# Patient Record
Sex: MALE | Race: WHITE | NOT HISPANIC OR LATINO | Employment: FULL TIME | URBAN - METROPOLITAN AREA
[De-identification: names, ages, dates, MRNs, and addresses within clinical notes are randomized per-mention and may not be internally consistent; named-entity substitution may affect disease eponyms.]

---

## 2018-03-14 ENCOUNTER — OFFICE VISIT (OUTPATIENT)
Dept: FAMILY MEDICINE | Facility: CLINIC | Age: 43
End: 2018-03-14
Payer: COMMERCIAL

## 2018-03-14 VITALS
HEIGHT: 74 IN | HEART RATE: 74 BPM | TEMPERATURE: 98 F | WEIGHT: 262 LBS | DIASTOLIC BLOOD PRESSURE: 82 MMHG | SYSTOLIC BLOOD PRESSURE: 128 MMHG | BODY MASS INDEX: 33.62 KG/M2

## 2018-03-14 DIAGNOSIS — R23.8 SKIN IRRITATION: ICD-10-CM

## 2018-03-14 DIAGNOSIS — T14.8XXA EXCORIATION: Primary | ICD-10-CM

## 2018-03-14 PROCEDURE — 3079F DIAST BP 80-89 MM HG: CPT | Mod: CPTII,S$GLB,, | Performed by: NURSE PRACTITIONER

## 2018-03-14 PROCEDURE — 3074F SYST BP LT 130 MM HG: CPT | Mod: CPTII,S$GLB,, | Performed by: NURSE PRACTITIONER

## 2018-03-14 PROCEDURE — 99999 PR PBB SHADOW E&M-EST. PATIENT-LVL III: CPT | Mod: PBBFAC,,, | Performed by: NURSE PRACTITIONER

## 2018-03-14 PROCEDURE — 99213 OFFICE O/P EST LOW 20 MIN: CPT | Mod: SA,S$GLB,, | Performed by: NURSE PRACTITIONER

## 2018-03-14 RX ORDER — MUPIROCIN 20 MG/G
OINTMENT TOPICAL 3 TIMES DAILY
Qty: 1 TUBE | Refills: 0 | Status: SHIPPED | OUTPATIENT
Start: 2018-03-14 | End: 2018-03-24

## 2018-03-14 NOTE — PROGRESS NOTES
Subjective:       Patient ID: Luke Del Rosario is a 42 y.o. male.    Chief Complaint: Rash (over the eye)    Rash   This is a new problem. The current episode started today. The problem is unchanged. The affected locations include theface. The rash is characterized by dryness. He was exposed to nothing. Pertinent negatives include no anorexia, congestion, cough, diarrhea, eye pain, facial edema, fatigue, fever, joint pain, nail changes, rhinorrhea, shortness of breath, sore throat or vomiting. Past treatments include nothing. The treatment provided no relief. His past medical history is significant for varicella. There is no history of allergies, asthma or eczema.     Past Medical History:   Diagnosis Date    HLD (hyperlipidemia)     HTN (hypertension) 2/7/2012    Hypertension     Migraines     Panic disorder     Panic disorder      Social History     Social History    Marital status:      Spouse name: N/A    Number of children: N/A    Years of education: N/A     Occupational History    Not on file.     Social History Main Topics    Smoking status: Never Smoker    Smokeless tobacco: Never Used    Alcohol use Yes      Comment: Socially    Drug use: No    Sexual activity: Yes     Partners: Female     Social History Narrative    No narrative on file     History reviewed. No pertinent surgical history.    Review of Systems   Constitutional: Negative.  Negative for fatigue and fever.   HENT: Negative.  Negative for congestion, rhinorrhea and sore throat.    Eyes: Negative.  Negative for pain.   Respiratory: Negative.  Negative for cough and shortness of breath.    Cardiovascular: Negative.    Gastrointestinal: Negative.  Negative for anorexia, diarrhea and vomiting.   Endocrine: Negative.    Genitourinary: Negative.    Musculoskeletal: Negative.  Negative for joint pain.   Skin: Positive for rash. Negative for nail changes.   Allergic/Immunologic: Negative.    Neurological: Negative.     Psychiatric/Behavioral: Negative.        Objective:      Physical Exam   Constitutional: He is oriented to person, place, and time. He appears well-developed and well-nourished.   HENT:   Head: Normocephalic.   Right Ear: External ear normal.   Left Ear: External ear normal.   Nose: Nose normal.   Mouth/Throat: Oropharynx is clear and moist.   Eyes: Conjunctivae are normal. Pupils are equal, round, and reactive to light.       Neck: Normal range of motion. Neck supple.   Cardiovascular: Normal rate, regular rhythm and normal heart sounds.    Pulmonary/Chest: Effort normal and breath sounds normal.   Abdominal: Soft. Bowel sounds are normal.   Musculoskeletal: Normal range of motion.   Neurological: He is oriented to person, place, and time.   Skin: Skin is warm and dry. Capillary refill takes 2 to 3 seconds.   Psychiatric: He has a normal mood and affect. His behavior is normal. Judgment and thought content normal.   Nursing note and vitals reviewed.      Assessment:       1. Excoriation    2. Skin irritation        Plan:           Luke was seen today for rash.    Diagnoses and all orders for this visit:    Excoriation  Skin irritation  -     mupirocin (BACTROBAN) 2 % ointment; Apply topically 3 (three) times daily.  Report to ER immediately if symptoms worsen  F/U with PCP

## 2019-05-09 ENCOUNTER — TELEPHONE (OUTPATIENT)
Dept: FAMILY MEDICINE | Facility: CLINIC | Age: 44
End: 2019-05-09

## 2019-05-09 NOTE — TELEPHONE ENCOUNTER
----- Message from Mary Lester sent at 5/9/2019  3:21 PM CDT -----   Patient would like to schedule appointment . New patient

## 2019-05-09 NOTE — TELEPHONE ENCOUNTER
Returned pt call. Unable to connect with patient. Left a vm message asking pt to call back to schedule the appt.

## 2019-05-10 ENCOUNTER — OFFICE VISIT (OUTPATIENT)
Dept: FAMILY MEDICINE | Facility: CLINIC | Age: 44
End: 2019-05-10
Payer: COMMERCIAL

## 2019-05-10 ENCOUNTER — LAB VISIT (OUTPATIENT)
Dept: LAB | Facility: HOSPITAL | Age: 44
End: 2019-05-10
Attending: NURSE PRACTITIONER
Payer: COMMERCIAL

## 2019-05-10 VITALS
SYSTOLIC BLOOD PRESSURE: 146 MMHG | TEMPERATURE: 99 F | DIASTOLIC BLOOD PRESSURE: 96 MMHG | WEIGHT: 262.38 LBS | BODY MASS INDEX: 33.67 KG/M2 | HEART RATE: 73 BPM | HEIGHT: 74 IN

## 2019-05-10 DIAGNOSIS — Z23 IMMUNIZATION DUE: ICD-10-CM

## 2019-05-10 DIAGNOSIS — Z00.00 ANNUAL PHYSICAL EXAM: ICD-10-CM

## 2019-05-10 DIAGNOSIS — Z00.00 ANNUAL PHYSICAL EXAM: Primary | ICD-10-CM

## 2019-05-10 PROCEDURE — 90715 TDAP VACCINE GREATER THAN OR EQUAL TO 7YO IM: ICD-10-PCS | Mod: S$GLB,,, | Performed by: NURSE PRACTITIONER

## 2019-05-10 PROCEDURE — 3077F SYST BP >= 140 MM HG: CPT | Mod: CPTII,S$GLB,, | Performed by: NURSE PRACTITIONER

## 2019-05-10 PROCEDURE — 99396 PR PREVENTIVE VISIT,EST,40-64: ICD-10-PCS | Mod: 25,S$GLB,, | Performed by: NURSE PRACTITIONER

## 2019-05-10 PROCEDURE — 90715 TDAP VACCINE 7 YRS/> IM: CPT | Mod: S$GLB,,, | Performed by: NURSE PRACTITIONER

## 2019-05-10 PROCEDURE — 86735 MUMPS ANTIBODY: CPT

## 2019-05-10 PROCEDURE — 99396 PREV VISIT EST AGE 40-64: CPT | Mod: 25,S$GLB,, | Performed by: NURSE PRACTITIONER

## 2019-05-10 PROCEDURE — 3080F DIAST BP >= 90 MM HG: CPT | Mod: CPTII,S$GLB,, | Performed by: NURSE PRACTITIONER

## 2019-05-10 PROCEDURE — 86765 RUBEOLA ANTIBODY: CPT

## 2019-05-10 PROCEDURE — 90471 TDAP VACCINE GREATER THAN OR EQUAL TO 7YO IM: ICD-10-PCS | Mod: S$GLB,,, | Performed by: NURSE PRACTITIONER

## 2019-05-10 PROCEDURE — 3080F PR MOST RECENT DIASTOLIC BLOOD PRESSURE >= 90 MM HG: ICD-10-PCS | Mod: CPTII,S$GLB,, | Performed by: NURSE PRACTITIONER

## 2019-05-10 PROCEDURE — 3077F PR MOST RECENT SYSTOLIC BLOOD PRESSURE >= 140 MM HG: ICD-10-PCS | Mod: CPTII,S$GLB,, | Performed by: NURSE PRACTITIONER

## 2019-05-10 PROCEDURE — 86787 VARICELLA-ZOSTER ANTIBODY: CPT

## 2019-05-10 PROCEDURE — 86762 RUBELLA ANTIBODY: CPT

## 2019-05-10 PROCEDURE — 36415 COLL VENOUS BLD VENIPUNCTURE: CPT | Mod: PO

## 2019-05-10 PROCEDURE — 99999 PR PBB SHADOW E&M-EST. PATIENT-LVL III: ICD-10-PCS | Mod: PBBFAC,,, | Performed by: NURSE PRACTITIONER

## 2019-05-10 PROCEDURE — 90471 IMMUNIZATION ADMIN: CPT | Mod: S$GLB,,, | Performed by: NURSE PRACTITIONER

## 2019-05-10 PROCEDURE — 99999 PR PBB SHADOW E&M-EST. PATIENT-LVL III: CPT | Mod: PBBFAC,,, | Performed by: NURSE PRACTITIONER

## 2019-05-10 RX ORDER — MULTIVITAMIN
1 TABLET ORAL
COMMUNITY

## 2019-05-10 RX ORDER — OMEGA-3-ACID ETHYL ESTERS 1 G/1
2 CAPSULE, LIQUID FILLED ORAL
COMMUNITY
End: 2019-05-10

## 2019-05-10 RX ORDER — LISINOPRIL 10 MG/1
10 TABLET ORAL
COMMUNITY
End: 2019-05-10

## 2019-05-10 NOTE — PROGRESS NOTES
Subjective:       Patient ID: Luke Del Rosario is a 43 y.o. male.    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  He does report having chickenpox as a child.    Review of Systems   Constitutional: Negative for fatigue, fever and unexpected weight change.   HENT: Negative for ear pain and sore throat.    Eyes: Negative.  Negative for pain and visual disturbance.   Respiratory: Negative for cough and shortness of breath.    Cardiovascular: Negative for chest pain and palpitations.   Gastrointestinal: Negative for abdominal pain, diarrhea, nausea and vomiting.   Genitourinary: Negative for dysuria and frequency.   Musculoskeletal: Negative for arthralgias and myalgias.   Skin: Negative for color change and rash.   Neurological: Negative for dizziness and headaches.   Psychiatric/Behavioral: Negative for sleep disturbance. The patient is not nervous/anxious.        Vitals:    05/10/19 1529   BP: (!) 146/96   Pulse: 73   Temp: 98.5 °F (36.9 °C)       Objective:     Current Outpatient Medications   Medication Sig Dispense Refill    CINNAMON BARK (CINNAMON ORAL) Take by mouth.        DOCOSAHEXANOIC ACID/EPA (FISH OIL ORAL) Take by mouth.        Lactobacillus rhamnosus GG (CULTURELLE) 10 billion cell capsule Take 1 capsule by mouth once daily.      magnesium 30 mg Tab Take by mouth.        multivit with min-FA-lycopene (ONE-A-DAY MEN'S) 0.4-600 mg-mcg Tab Every day      multivitamin (ONE DAILY MULTIVITAMIN) per tablet Take 1 tablet by mouth.      fexofenadine (ALLEGRA) 180 MG tablet Take 1 tablet (180 mg total) by mouth once daily. 20 tablet 0     No current  facility-administered medications for this visit.        Physical Exam   Constitutional: He is oriented to person, place, and time. He appears well-developed. No distress.   HENT:   Head: Normocephalic and atraumatic.   Eyes: Pupils are equal, round, and reactive to light. EOM are normal.   Neck: Normal range of motion. Neck supple.   Cardiovascular: Normal rate and regular rhythm.   Pulmonary/Chest: Effort normal and breath sounds normal.   Musculoskeletal: Normal range of motion.   Neurological: He is alert and oriented to person, place, and time.   Skin: Skin is warm and dry. No rash noted.   Psychiatric: He has a normal mood and affect. Thought content normal.   Nursing note and vitals reviewed.      Assessment:       1. Annual physical exam    2. Immunization due        Plan:   Annual physical exam  -     Varicella zoster antibody, IgG; Future; Expected date: 05/10/2019  -     RUBEOLA ANTIBODY IGG; Future; Expected date: 05/10/2019  -     RUBELLA ANTIBODY, IGG; Future; Expected date: 05/10/2019  -     Mumps antibody, IgG; Future; Expected date: 05/10/2019    Immunization due  -     (In Office Administered) Tdap Vaccine        No follow-ups on file.    There are no Patient Instructions on file for this visit.

## 2019-05-13 LAB
MUMPS IGG INTERPRETATION: POSITIVE
MUMPS IGG SCREEN: 1.69 ISR (ref 0–0.9)
RUBV IGG SER-ACNC: 12.3 IU/ML
RUBV IGG SER-IMP: REACTIVE

## 2019-05-14 ENCOUNTER — PATIENT MESSAGE (OUTPATIENT)
Dept: FAMILY MEDICINE | Facility: CLINIC | Age: 44
End: 2019-05-14

## 2019-05-15 LAB
RUBEOLA IGG ANTIBODY: 1.41 ISR (ref 0–0.9)
RUBEOLA INTERPRETATION: POSITIVE
VARICELLA INTERPRETATION: POSITIVE
VARICELLA ZOSTER IGG: 3.64 ISR (ref 0–0.9)

## 2019-05-16 ENCOUNTER — PATIENT MESSAGE (OUTPATIENT)
Dept: FAMILY MEDICINE | Facility: CLINIC | Age: 44
End: 2019-05-16

## 2019-08-16 ENCOUNTER — TELEPHONE (OUTPATIENT)
Dept: INTERNAL MEDICINE | Facility: CLINIC | Age: 44
End: 2019-08-16

## 2019-10-08 ENCOUNTER — OFFICE VISIT (OUTPATIENT)
Dept: FAMILY MEDICINE | Facility: CLINIC | Age: 44
End: 2019-10-08
Payer: COMMERCIAL

## 2019-10-08 VITALS
HEART RATE: 80 BPM | BODY MASS INDEX: 33.5 KG/M2 | SYSTOLIC BLOOD PRESSURE: 130 MMHG | WEIGHT: 261 LBS | TEMPERATURE: 100 F | DIASTOLIC BLOOD PRESSURE: 83 MMHG | HEIGHT: 74 IN

## 2019-10-08 DIAGNOSIS — Z00.00 ROUTINE ADULT HEALTH MAINTENANCE: ICD-10-CM

## 2019-10-08 DIAGNOSIS — I10 HYPERTENSION, UNSPECIFIED TYPE: ICD-10-CM

## 2019-10-08 DIAGNOSIS — G43.709 CHRONIC MIGRAINE WITHOUT AURA WITHOUT STATUS MIGRAINOSUS, NOT INTRACTABLE: Primary | ICD-10-CM

## 2019-10-08 DIAGNOSIS — J30.89 ALLERGIC RHINITIS DUE TO OTHER ALLERGIC TRIGGER, UNSPECIFIED SEASONALITY: ICD-10-CM

## 2019-10-08 DIAGNOSIS — Z23 INFLUENZA VACCINE NEEDED: ICD-10-CM

## 2019-10-08 PROCEDURE — 3079F PR MOST RECENT DIASTOLIC BLOOD PRESSURE 80-89 MM HG: ICD-10-PCS | Mod: CPTII,S$GLB,, | Performed by: INTERNAL MEDICINE

## 2019-10-08 PROCEDURE — 90471 IMMUNIZATION ADMIN: CPT | Mod: S$GLB,,, | Performed by: INTERNAL MEDICINE

## 2019-10-08 PROCEDURE — 99999 PR PBB SHADOW E&M-EST. PATIENT-LVL IV: ICD-10-PCS | Mod: PBBFAC,,, | Performed by: INTERNAL MEDICINE

## 2019-10-08 PROCEDURE — 90686 IIV4 VACC NO PRSV 0.5 ML IM: CPT | Mod: S$GLB,,, | Performed by: INTERNAL MEDICINE

## 2019-10-08 PROCEDURE — 99214 OFFICE O/P EST MOD 30 MIN: CPT | Mod: 25,S$GLB,, | Performed by: INTERNAL MEDICINE

## 2019-10-08 PROCEDURE — 3079F DIAST BP 80-89 MM HG: CPT | Mod: CPTII,S$GLB,, | Performed by: INTERNAL MEDICINE

## 2019-10-08 PROCEDURE — 3075F PR MOST RECENT SYSTOLIC BLOOD PRESS GE 130-139MM HG: ICD-10-PCS | Mod: CPTII,S$GLB,, | Performed by: INTERNAL MEDICINE

## 2019-10-08 PROCEDURE — 3008F BODY MASS INDEX DOCD: CPT | Mod: CPTII,S$GLB,, | Performed by: INTERNAL MEDICINE

## 2019-10-08 PROCEDURE — 3008F PR BODY MASS INDEX (BMI) DOCUMENTED: ICD-10-PCS | Mod: CPTII,S$GLB,, | Performed by: INTERNAL MEDICINE

## 2019-10-08 PROCEDURE — 99214 PR OFFICE/OUTPT VISIT, EST, LEVL IV, 30-39 MIN: ICD-10-PCS | Mod: 25,S$GLB,, | Performed by: INTERNAL MEDICINE

## 2019-10-08 PROCEDURE — 90471 FLU VACCINE (QUAD) GREATER THAN OR EQUAL TO 3YO PRESERVATIVE FREE IM: ICD-10-PCS | Mod: S$GLB,,, | Performed by: INTERNAL MEDICINE

## 2019-10-08 PROCEDURE — 99999 PR PBB SHADOW E&M-EST. PATIENT-LVL IV: CPT | Mod: PBBFAC,,, | Performed by: INTERNAL MEDICINE

## 2019-10-08 PROCEDURE — 90686 FLU VACCINE (QUAD) GREATER THAN OR EQUAL TO 3YO PRESERVATIVE FREE IM: ICD-10-PCS | Mod: S$GLB,,, | Performed by: INTERNAL MEDICINE

## 2019-10-08 PROCEDURE — 3075F SYST BP GE 130 - 139MM HG: CPT | Mod: CPTII,S$GLB,, | Performed by: INTERNAL MEDICINE

## 2019-10-08 RX ORDER — CETIRIZINE HYDROCHLORIDE 10 MG/1
10 TABLET, CHEWABLE ORAL DAILY
Qty: 30 TABLET | Refills: 3 | Status: SHIPPED | OUTPATIENT
Start: 2019-10-08 | End: 2021-03-15

## 2019-10-08 RX ORDER — FLUTICASONE PROPIONATE 50 MCG
SPRAY, SUSPENSION (ML) NASAL
Qty: 16 G | Refills: 12 | Status: SHIPPED | OUTPATIENT
Start: 2019-10-08 | End: 2021-03-15

## 2019-10-08 NOTE — PROGRESS NOTES
Assessment:  Migraines  New headache with hx of migraines in past. Sinus vs tension vs migraine. No alarm features. Suspect mostly likely sinus related given presentation Will start trial of antihistamine and intranasal steroid. If no improvement or worsening of symptoms, can discuss trial of abortive therapy for possible migraine.    HTN (hypertension)  Patient reports elevated BP at recent office visits. HTN diagnosis in past but resolved with weight loss. BP normal today. Plan to start BP log at home and will discuss at follow up.     Plan:  Chronic migraine without aura without status migrainosus, not intractable  -     cetirizine 10 mg chewable tablet; Take 10 mg by mouth once daily.  Dispense: 30 tablet; Refill: 3  -     fluticasone propionate (FLONASE) 50 mcg/actuation nasal spray; Use 2 sprays to each nostril daily  Dispense: 16 g; Refill: 12    Hypertension, unspecified type  -     Lipid panel; Future; Expected date: 10/08/2019    Influenza vaccine needed  -     Influenza - Quadrivalent (PF)    Routine adult health maintenance  -     Comprehensive metabolic panel; Future; Expected date: 10/08/2019  -     Lipid panel; Future; Expected date: 10/08/2019  -     CBC auto differential; Future; Expected date: 10/08/2019    Allergic rhinitis due to other allergic trigger, unspecified seasonality  -     cetirizine 10 mg chewable tablet; Take 10 mg by mouth once daily.  Dispense: 30 tablet; Refill: 3  -     fluticasone propionate (FLONASE) 50 mcg/actuation nasal spray; Use 2 sprays to each nostril daily  Dispense: 16 g; Refill: 12        Follow up in about 2 weeks (around 10/22/2019).        Betsey Mejia MD      HPI:  Patient is here for initial visit with me with CC of HA and elevated BP.  Hx of migraines, diagnosed in 30's. Previously treated with abortive therapy with triptans. Hx of HTN but no longer present and HA resolved along with resolution of HTN. Return of HA for past 3 weeks in morning but different  than previous migraines. Now described as pressure-like pain on left side of face, occurring mostly in the mornings, lasting several hours. Mild photophobia but no significant vision changes. No headache in middle of night. Also reports increased sinus congestion recently with increase nasal mucus, worse in mornings as well. Is currently being followed by ENT with plan for CT on Friday. He tried OTC analgesic today with some relief. 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     Also concerned about elevated BP. Recently elevated at other doctor's visits (140/90). Today normal. Has blood pressure cuff at home but has not checked.     Past Medical History:  Past Medical History:   Diagnosis Date    HLD (hyperlipidemia)     HTN (hypertension) 2/7/2012    Migraines     Panic disorder      Past Surgical History:   Procedure Laterality Date    ARTHROSCOPY OF KNEE Right      Review of patient's allergies indicates:   Allergen Reactions    No known drug allergies      Social History     Tobacco Use    Smoking status: Never Smoker    Smokeless tobacco: Never Used   Substance Use Topics    Alcohol use: Yes     Frequency: 2-3 times a week    Drug use: No     Family History   Problem Relation Age of Onset    Diabetes Father     Diabetes Paternal Aunt     Diabetes Paternal Uncle      Current Outpatient Medications on File Prior to Visit   Medication Sig Dispense Refill    CINNAMON BARK (CINNAMON ORAL) Take by mouth.        DOCOSAHEXANOIC ACID/EPA (FISH OIL ORAL) Take by mouth.        Lactobacillus rhamnosus GG (CULTURELLE) 10 billion cell capsule Take 1 capsule by mouth once daily.      magnesium 30 mg Tab Take by mouth.        multivit with min-FA-lycopene (ONE-A-DAY MEN'S) 0.4-600 mg-mcg Tab Every day      multivitamin (ONE DAILY MULTIVITAMIN) per tablet Take 1 tablet by mouth.      fexofenadine (ALLEGRA) 180 MG tablet Take 1 tablet (180 mg total) by mouth once daily. 20  "tablet 0     No current facility-administered medications on file prior to visit.        Review of Systems   Constitutional: Negative for chills, diaphoresis, fatigue and fever.   HENT: Positive for sinus pressure. Negative for congestion, ear pain, postnasal drip, rhinorrhea and sore throat.    Eyes: Positive for photophobia. Negative for pain and redness.   Respiratory: Negative for cough, chest tightness and shortness of breath.    Cardiovascular: Negative for chest pain and leg swelling.   Gastrointestinal: Negative for abdominal pain, constipation, diarrhea, nausea and vomiting.   Genitourinary: Negative for dysuria and hematuria.   Musculoskeletal: Negative for arthralgias and joint swelling.   Skin: Negative for rash.   Neurological: Positive for headaches. Negative for syncope, weakness, light-headedness and numbness.       Vitals:    10/08/19 1353   BP: 130/83   Pulse: 80   Temp: 99.6 °F (37.6 °C)   Weight: 118.4 kg (261 lb)   Height: 6' 2" (1.88 m)       Wt Readings from Last 3 Encounters:   10/08/19 118.4 kg (261 lb)   05/10/19 119 kg (262 lb 6.4 oz)   03/14/18 118.8 kg (262 lb)       Physical Exam   Constitutional: He is oriented to person, place, and time. He appears well-developed and well-nourished. No distress.   HENT:   Head: Atraumatic.   Boggy, erythematous inferior nasal turbinates   Eyes: Conjunctivae and EOM are normal.   Neck: Normal range of motion. Neck supple.   Cardiovascular: Normal rate, regular rhythm, normal heart sounds and intact distal pulses.   No murmur heard.  Pulmonary/Chest: Effort normal and breath sounds normal. No respiratory distress.   Abdominal: Soft. Bowel sounds are normal. He exhibits no distension. There is no tenderness.   Musculoskeletal: Normal range of motion.   Neurological: He is alert and oriented to person, place, and time. No cranial nerve deficit or sensory deficit. He exhibits normal muscle tone.   Skin: Skin is warm and dry. No rash noted.   Psychiatric: " He has a normal mood and affect.       Health Maintenance   Topic Date Due    Lipid Panel  12/24/2017    TETANUS VACCINE  05/10/2029

## 2019-10-08 NOTE — PATIENT INSTRUCTIONS
Sinus Headache    The sinuses are air-filled spaces within the bones of the face. They connect to the inside of the nose. Sinusitis is an inflammation of the tissue lining the sinus cavity. Sinus inflammation can occur during a cold or hay fever (allergies to pollens and other particles in the air) and cause symptoms of sinus congestion and fullness and perhaps a low-grade fever. An infection is usually present when there is also facial pain or headache and green or yellow drainage from the nose or into the back of the throat (postnasal drip). Antibiotics are often prescribed to treat this condition.  Sinus headache may cause pain in different places, depending on which sinuses are infected. There may be pain in the temples, forehead, top of the head, behind or around the eye, across the cheekbone, or into the upper teeth.  You may find that changing your position, sitting upright or lying down, will bring some relief.  Home care  The following guidelines will help you care for yourself at home:  · Drink plenty of water, hot tea, and other liquids to stay well hydrated. This thins the mucus and helps your sinuses drain.  · Apply heat to the painful areas of the face. Use a towel soaked in hot water. Or  the shower with the hot spray on your face. This is a good way to inhale warm water vapor and get heat on your face at the same time. Cover your mouth and nose with your hands so you can still breathe as you do this.  · Use a cool mist vaporizer at night. Suck on peppermint, menthol, or eucalyptus hard candies during the day.  · An expectorant containing guaifenesin helps thin the mucus. It also helps your sinuses drain.  · You may use over-the-counter decongestants unless a similar medicine was prescribed. Nasal sprays or drops work the fastest. Use one that contains phenylephrine or oxymetazoline. First blow your nose gently to remove mucus. Then apply the spray or drops. Don't use decongestant nasal  sprays or drops more often than the label says or for more than 3 days. This can make symptoms worse. Nasal sprays or drops prescribed by your doctor typically do not have these limits. Check with your doctor or pharmacist. You may also use oral tablets containing pseudoephedrine. Side effects from oral decongestants tend to be worse than with nasal sprays or drops, and may keep you from using them. Many sinus remedies combine ingredients, which may increase side effects. Also, if you are taking a combination medicine with another medicine, be sure you are not taking a double dose of anything by mistake. Read the labels or ask the pharmacist for help. Talk with your doctor before using decongestants if you have high blood pressure, heart disease, glaucoma, or prostate trouble.  · Antihistamines may help if allergies are causing your sinusitis. You can get chlorpheniramine and diphenhydramine over the counter, but these can cause drowsiness. Don't use these if you have glaucoma or if you are a man with trouble urinating due to an enlarged prostate. Over-the-counter antihistamines containing loratidine and cetirizine cause less drowsiness and may be a better choice for daytime use.  · When allergies cause your sinusitis, a saline nasal rinse may give relief. A saline nasal rinse reduces swelling and clears excess mucus. This allows sinuses to drain. Prepackaged kits are available at most drugstores. These contain premixed salt packets and an irrigation device. If antibiotics have been prescribed to treat an acute sinus infection, talk with your doctor before using a nasal rinse to be sure it is safe for you.  · You may use over-the-counter medicine to control pain and fever, unless another pain medicine was prescribed. Talk with your doctor before using acetaminophen or ibuprofen if you have chronic liver or kidney disease. Also talk with your doctor if you have ever had a stomach ulcer. Aspirin should never be used  in anyone under 18 years of age who has a fever. It may cause a life-threatening condition called Reye syndrome.  · If antibiotics were given, finish all of them, even if you are feeling better after a few days.  Follow-up care  Follow up with your healthcare provider, or as advised if your symptoms aren't better in 1 week.  Call 911  Call 911 if any of these occur:  · Unusual drowsiness or confusion  · Swelling of the forehead or eyelids  · Vision problems including blurred or double vision  · Seizure  When to seek medical advice  Call your healthcare provider right away if any of these occur:  · Facial pain or headache becomes more severe  · Stiff neck  · Fever over 100.4º F (38.0º C) for more than 3 days on antibiotics  · Bleeding from the nose or throat  Date Last Reviewed: 10/1/2016  © 5627-1835 [employer]. [address] [place] [address]. All rights reserved. This information is not intended as a substitute for professional medical care. Always follow your healthcare professional's instructions.

## 2019-10-08 NOTE — ASSESSMENT & PLAN NOTE
New headache with hx of migraines in past. Sinus vs tension vs migraine. No alarm features. Suspect mostly likely sinus related given presentation Will start trial of antihistamine and intranasal steroid. If no improvement or worsening of symptoms, can discuss trial of abortive therapy for possible migraine.

## 2019-10-08 NOTE — ASSESSMENT & PLAN NOTE
Patient reports elevated BP at recent office visits. HTN diagnosis in past but resolved with weight loss. BP normal today. Plan to start BP log at home and will discuss at follow up.

## 2019-10-09 ENCOUNTER — PATIENT MESSAGE (OUTPATIENT)
Dept: FAMILY MEDICINE | Facility: CLINIC | Age: 44
End: 2019-10-09

## 2019-10-10 ENCOUNTER — PATIENT MESSAGE (OUTPATIENT)
Dept: FAMILY MEDICINE | Facility: CLINIC | Age: 44
End: 2019-10-10

## 2019-10-22 ENCOUNTER — OFFICE VISIT (OUTPATIENT)
Dept: FAMILY MEDICINE | Facility: CLINIC | Age: 44
End: 2019-10-22
Payer: COMMERCIAL

## 2019-10-22 VITALS
TEMPERATURE: 98 F | WEIGHT: 261 LBS | DIASTOLIC BLOOD PRESSURE: 86 MMHG | HEIGHT: 74 IN | SYSTOLIC BLOOD PRESSURE: 130 MMHG | BODY MASS INDEX: 33.5 KG/M2 | HEART RATE: 85 BPM

## 2019-10-22 DIAGNOSIS — G44.89 OTHER HEADACHE SYNDROME: ICD-10-CM

## 2019-10-22 PROCEDURE — 99213 OFFICE O/P EST LOW 20 MIN: CPT | Mod: S$GLB,,, | Performed by: INTERNAL MEDICINE

## 2019-10-22 PROCEDURE — 3079F PR MOST RECENT DIASTOLIC BLOOD PRESSURE 80-89 MM HG: ICD-10-PCS | Mod: CPTII,S$GLB,, | Performed by: INTERNAL MEDICINE

## 2019-10-22 PROCEDURE — 99999 PR PBB SHADOW E&M-EST. PATIENT-LVL IV: CPT | Mod: PBBFAC,,, | Performed by: INTERNAL MEDICINE

## 2019-10-22 PROCEDURE — 3075F PR MOST RECENT SYSTOLIC BLOOD PRESS GE 130-139MM HG: ICD-10-PCS | Mod: CPTII,S$GLB,, | Performed by: INTERNAL MEDICINE

## 2019-10-22 PROCEDURE — 3008F PR BODY MASS INDEX (BMI) DOCUMENTED: ICD-10-PCS | Mod: CPTII,S$GLB,, | Performed by: INTERNAL MEDICINE

## 2019-10-22 PROCEDURE — 3008F BODY MASS INDEX DOCD: CPT | Mod: CPTII,S$GLB,, | Performed by: INTERNAL MEDICINE

## 2019-10-22 PROCEDURE — 99999 PR PBB SHADOW E&M-EST. PATIENT-LVL IV: ICD-10-PCS | Mod: PBBFAC,,, | Performed by: INTERNAL MEDICINE

## 2019-10-22 PROCEDURE — 3079F DIAST BP 80-89 MM HG: CPT | Mod: CPTII,S$GLB,, | Performed by: INTERNAL MEDICINE

## 2019-10-22 PROCEDURE — 3075F SYST BP GE 130 - 139MM HG: CPT | Mod: CPTII,S$GLB,, | Performed by: INTERNAL MEDICINE

## 2019-10-22 PROCEDURE — 99213 PR OFFICE/OUTPT VISIT, EST, LEVL III, 20-29 MIN: ICD-10-PCS | Mod: S$GLB,,, | Performed by: INTERNAL MEDICINE

## 2019-10-22 NOTE — ASSESSMENT & PLAN NOTE
Recent visit for headache. Has a hx of migraines in past but current headache not as severe. Sinus vs tension vs migraine. No alarm features. Suspected initially that likely sinus related given presentation. Slightly improved with antihistamine and intranasal steroid but still present. Just recently started kota pot. Would like to continue with current therapy but if no improvement will proceed with further imaging.

## 2019-10-22 NOTE — PROGRESS NOTES
Assessment/Plan:    Other headache syndrome  Recent visit for headache. Has a hx of migraines in past but current headache not as severe. Sinus vs tension vs migraine. No alarm features. Suspected initially that likely sinus related given presentation. Slightly improved with antihistamine and intranasal steroid but still present. Just recently started kota pot. Would like to continue with current therapy but if no improvement will proceed with further imaging.    _____________________________________________________________________________________________________________________________________________________    Orders this visit:    Other headache syndrome      Follow up if symptoms worsen or fail to improve.    Betsey Mejia MD  _____________________________________________________________________________________________________________________________________________________    HPI:    Patient is in clinic today as an established patient with continued headache. Headache still present but not as severe and less frequent. Continues to be unilateral. Has been taking antihistamine and flonase. Has been being seen by ENT for sinuses and recently started kota pot. He did just recently complete a sinus CT but was normal. Patient with hx of migraines when he was younger but headache now not as severe. No associated neurologic deficits.     Also discussed BP at last appointment. He was having elevated reading with home blood pressure cuff. Got a new blood pressure cuff with much improved readings. He plans to continue checking at home.    Past Medical History:  Past Medical History:   Diagnosis Date    HLD (hyperlipidemia)     HTN (hypertension) 2/7/2012    Migraines     Panic disorder      Past Surgical History:   Procedure Laterality Date    ARTHROSCOPY OF KNEE Right      Review of patient's allergies indicates:   Allergen Reactions    No known drug allergies      Social History     Tobacco Use    Smoking  status: Never Smoker    Smokeless tobacco: Never Used   Substance Use Topics    Alcohol use: Yes     Frequency: 2-3 times a week    Drug use: No     Family History   Problem Relation Age of Onset    Diabetes Father     Diabetes Paternal Aunt     Diabetes Paternal Uncle      Current Outpatient Medications on File Prior to Visit   Medication Sig Dispense Refill    cetirizine 10 mg chewable tablet Take 10 mg by mouth once daily. 30 tablet 3    CINNAMON BARK (CINNAMON ORAL) Take by mouth.        DOCOSAHEXANOIC ACID/EPA (FISH OIL ORAL) Take by mouth.        fexofenadine (ALLEGRA) 180 MG tablet Take 1 tablet (180 mg total) by mouth once daily. 20 tablet 0    fluticasone propionate (FLONASE) 50 mcg/actuation nasal spray Use 2 sprays to each nostril daily 16 g 12    Lactobacillus rhamnosus GG (CULTURELLE) 10 billion cell capsule Take 1 capsule by mouth once daily.      magnesium 30 mg Tab Take by mouth.        multivit with min-FA-lycopene (ONE-A-DAY MEN'S) 0.4-600 mg-mcg Tab Every day      multivitamin (ONE DAILY MULTIVITAMIN) per tablet Take 1 tablet by mouth.       No current facility-administered medications on file prior to visit.        Review of Systems   Constitutional: Negative for activity change and unexpected weight change.   HENT: Negative for hearing loss, rhinorrhea and trouble swallowing.    Eyes: Negative for discharge and visual disturbance.   Respiratory: Negative for chest tightness and wheezing.    Cardiovascular: Negative for chest pain and palpitations.   Gastrointestinal: Negative for blood in stool, constipation, diarrhea and vomiting.   Endocrine: Negative for polydipsia and polyuria.   Genitourinary: Negative for difficulty urinating, hematuria and urgency.   Musculoskeletal: Negative for arthralgias, joint swelling and neck pain.   Neurological: Positive for headaches. Negative for weakness.   Psychiatric/Behavioral: Negative for confusion and dysphoric mood.       Vitals:     "10/22/19 1426   BP: 130/86   Pulse: 85   Temp: 97.8 °F (36.6 °C)   Weight: 118.4 kg (261 lb)   Height: 6' 2" (1.88 m)       Wt Readings from Last 3 Encounters:   10/22/19 118.4 kg (261 lb)   10/08/19 118.4 kg (261 lb)   05/10/19 119 kg (262 lb 6.4 oz)       Physical Exam   Constitutional: He is oriented to person, place, and time. He appears well-developed and well-nourished. No distress.   HENT:   Head: Normocephalic and atraumatic.   Eyes: Conjunctivae and EOM are normal.   Neck: Normal range of motion. Neck supple.   Cardiovascular: Normal rate, regular rhythm, normal heart sounds and intact distal pulses.   No murmur heard.  Pulmonary/Chest: Effort normal and breath sounds normal. No respiratory distress.   Abdominal: Soft. Bowel sounds are normal. He exhibits no distension. There is no tenderness.   Musculoskeletal: Normal range of motion.   Neurological: He is alert and oriented to person, place, and time. No cranial nerve deficit or sensory deficit.   Skin: Skin is warm and dry. No rash noted.   Psychiatric: He has a normal mood and affect.       "

## 2019-10-22 NOTE — PATIENT INSTRUCTIONS
Continue kota pot. Can increase flonase to twice for the next couple weeks. Call clinic if headaches still present and will order MRI.  Continue blood pressure, goal <140/90

## 2019-10-31 ENCOUNTER — PATIENT MESSAGE (OUTPATIENT)
Dept: FAMILY MEDICINE | Facility: CLINIC | Age: 44
End: 2019-10-31

## 2019-10-31 DIAGNOSIS — I10 ESSENTIAL HYPERTENSION: Primary | ICD-10-CM

## 2019-10-31 RX ORDER — AMLODIPINE BESYLATE 2.5 MG/1
2.5 TABLET ORAL DAILY
Qty: 30 TABLET | Refills: 11 | Status: SHIPPED | OUTPATIENT
Start: 2019-10-31 | End: 2021-03-15

## 2019-12-18 ENCOUNTER — PATIENT MESSAGE (OUTPATIENT)
Dept: FAMILY MEDICINE | Facility: CLINIC | Age: 44
End: 2019-12-18

## 2020-04-30 ENCOUNTER — PATIENT MESSAGE (OUTPATIENT)
Dept: FAMILY MEDICINE | Facility: CLINIC | Age: 45
End: 2020-04-30

## 2020-04-30 ENCOUNTER — OFFICE VISIT (OUTPATIENT)
Dept: FAMILY MEDICINE | Facility: CLINIC | Age: 45
End: 2020-04-30
Payer: COMMERCIAL

## 2020-04-30 DIAGNOSIS — J30.1 NON-SEASONAL ALLERGIC RHINITIS DUE TO POLLEN: Primary | ICD-10-CM

## 2020-04-30 PROCEDURE — 99214 PR OFFICE/OUTPT VISIT, EST, LEVL IV, 30-39 MIN: ICD-10-PCS | Mod: 95,,, | Performed by: INTERNAL MEDICINE

## 2020-04-30 PROCEDURE — 99214 OFFICE O/P EST MOD 30 MIN: CPT | Mod: 95,,, | Performed by: INTERNAL MEDICINE

## 2020-04-30 RX ORDER — AZELASTINE 1 MG/ML
1 SPRAY, METERED NASAL 2 TIMES DAILY
Qty: 30 ML | Refills: 11 | Status: SHIPPED | OUTPATIENT
Start: 2020-04-30 | End: 2021-03-15

## 2020-04-30 NOTE — PROGRESS NOTES
Primary Care Telemedicine Note  The patient location is:  Patient Home   The chief complaint leading to consultation is: throat irritation  Total time spent with patient: 20 min    Visit type: Virtual visit with synchronous audio only and video  Each patient to whom he or she provides medical services by telemedicine is:  (1) informed of the relationship between the physician and patient and the respective role of any other health care provider with respect to management of the patient; and (2) notified that he or she may decline to receive medical services by telemedicine and may withdraw from such care at any time.      Assessment/Plan:    Non-seasonal allergic rhinitis due to pollen  -suspect throat irritation 2/2 allergies and patient trying to clear airway  -no concerning findings on picture he has sent me of oropharynx. Attached to this visit  -continue oral antihistamine. Plan to also start on intranasal antihistamine  -use sinus rinses as needed.    _____________________________________________________________________________________________________________________________________________________    Orders this visit:    Non-seasonal allergic rhinitis due to pollen  -     azelastine (ASTELIN) 137 mcg (0.1 %) nasal spray; 1 spray (137 mcg total) by Nasal route 2 (two) times daily.  Dispense: 30 mL; Refill: 11      Follow up if symptoms worsen or fail to improve.    Betsey Mejia MD  _____________________________________________________________________________________________________________________________________________________    CC: Throat irritation    HPI:  Patient with complaint of sore throat. He does report a chronic a hx of allergy issues and has increased nasal mucus and post-nasal drip. He is still using zyrtec daily. He tried flonase in the past but without much relief so stopped. Denies nasal congestion, cough, or fever. Does reports repeatedly tried to clear nasopharynx over the past couple  of days due to worsening post-nasal drip.     No other complaints today.    Past Medical History:  Past Medical History:   Diagnosis Date    HLD (hyperlipidemia)     HTN (hypertension) 2/7/2012    Migraines     Panic disorder      Past Surgical History:   Procedure Laterality Date    ARTHROSCOPY OF KNEE Right      Review of patient's allergies indicates:   Allergen Reactions    No known drug allergies      Social History     Tobacco Use    Smoking status: Never Smoker    Smokeless tobacco: Never Used   Substance Use Topics    Alcohol use: Yes     Frequency: 2-3 times a week    Drug use: No     Family History   Problem Relation Age of Onset    Diabetes Father     Diabetes Paternal Aunt     Diabetes Paternal Uncle      Current Outpatient Medications on File Prior to Visit   Medication Sig Dispense Refill    amLODIPine (NORVASC) 2.5 MG tablet Take 1 tablet (2.5 mg total) by mouth once daily. 30 tablet 11    cetirizine 10 mg chewable tablet Take 10 mg by mouth once daily. 30 tablet 3    CINNAMON BARK (CINNAMON ORAL) Take by mouth.        DOCOSAHEXANOIC ACID/EPA (FISH OIL ORAL) Take by mouth.        fexofenadine (ALLEGRA) 180 MG tablet Take 1 tablet (180 mg total) by mouth once daily. 20 tablet 0    fluticasone propionate (FLONASE) 50 mcg/actuation nasal spray Use 2 sprays to each nostril daily 16 g 12    Lactobacillus rhamnosus GG (CULTURELLE) 10 billion cell capsule Take 1 capsule by mouth once daily.      magnesium 30 mg Tab Take by mouth.        multivit with min-FA-lycopene (ONE-A-DAY MEN'S) 0.4-600 mg-mcg Tab Every day      multivitamin (ONE DAILY MULTIVITAMIN) per tablet Take 1 tablet by mouth.       No current facility-administered medications on file prior to visit.        Review of Systems   Constitutional: Negative for chills, fever and malaise/fatigue.   HENT: Positive for sore throat. Negative for sinus pain.    Respiratory: Negative for cough, shortness of breath and stridor.     Cardiovascular: Negative for chest pain, palpitations and leg swelling.   Gastrointestinal: Negative for abdominal pain, constipation and diarrhea.   Genitourinary: Negative for dysuria and frequency.   Musculoskeletal: Negative for back pain and joint pain.   Neurological: Negative for headaches.         Physical Exam   No flowsheet data found.  No flowsheet data found.      No flowsheet data found.    Physical Exam   Constitutional: He is oriented to person, place, and time. He appears well-developed and well-nourished. No distress.   HENT:   Head: Normocephalic and atraumatic.   Mouth/Throat: No oropharyngeal exudate.   Mild oropharyngeal erythema   Eyes: EOM are normal.   Neck: Normal range of motion.   Pulmonary/Chest: Effort normal. No respiratory distress.   Neurological: He is alert and oriented to person, place, and time.   Psychiatric: He has a normal mood and affect. His behavior is normal.

## 2020-05-04 PROBLEM — J30.1 NON-SEASONAL ALLERGIC RHINITIS DUE TO POLLEN: Status: ACTIVE | Noted: 2020-05-04

## 2020-05-04 NOTE — ASSESSMENT & PLAN NOTE
-suspect throat irritation 2/2 allergies and patient trying to clear airway  -no concerning findings on picture he has sent me of oropharynx. Attached to this visit  -continue oral antihistamine. Plan to also start on intranasal antihistamine  -use sinus rinses as needed.

## 2020-11-05 ENCOUNTER — PATIENT OUTREACH (OUTPATIENT)
Dept: ADMINISTRATIVE | Facility: HOSPITAL | Age: 45
End: 2020-11-05

## 2020-11-05 NOTE — PROGRESS NOTES
Working HTN Report       Called pt for home BP Reading, Pt Daughter Stated pt is busy       Yolanda FRANCO LPN Care Coordinator  Care Coordination Department  [facility]  [phone]

## 2020-11-05 NOTE — PROGRESS NOTES
[employer] HTN REPORT: Attempting to contact pt to see if pt takes home BP readings. Pt also needs pcp visit. Unable to reach patient at this time. Left voicemail.

## 2020-11-07 ENCOUNTER — PATIENT OUTREACH (OUTPATIENT)
Dept: ADMINISTRATIVE | Facility: HOSPITAL | Age: 45
End: 2020-11-07

## 2020-11-07 NOTE — PROGRESS NOTES
Working HTN Report     2nd Attempt to Contact   Called pt for home BP Reading,   Pt daughter Stated he is s busy     Yolanda FRANCO LPN Care Coordinator  Care Coordination Department  [facility]  [phone]

## 2020-11-22 ENCOUNTER — PATIENT MESSAGE (OUTPATIENT)
Dept: FAMILY MEDICINE | Facility: CLINIC | Age: 45
End: 2020-11-22

## 2020-11-22 DIAGNOSIS — Z01.84 ENCOUNTER FOR ANTIBODY RESPONSE EXAMINATION: ICD-10-CM

## 2020-11-23 NOTE — TELEPHONE ENCOUNTER
I have signed for the following orders AND/OR meds.  Please call the patient and ask the patient to schedule the testing AND/OR inform about any medications that were sent. Medications have been sent to pharmacy listed below      Orders Placed This Encounter   Procedures    COVID-19 (SARS CoV-2) IgG Antibody     Standing Status:   Future     Standing Expiration Date:   1/22/2022     Order Specific Question:   Diagnosis:     Answer:   Encounter for antibody response examination [[id number]]              [id number] - [place] - [address] [facility] [address] [place] [address]  [address] [facility] [address]  Phone: [phone] Fax: [phone]    [facility] - [place]  [address] [facility]  [address] [facility] [address]  Phone: [phone] Fax: [phone]    [id number] - [place] - [address] [facility] AT [facility]  [address] [facility] [address]  Phone: [phone] Fax: [phone]

## 2020-11-24 ENCOUNTER — PATIENT OUTREACH (OUTPATIENT)
Dept: ADMINISTRATIVE | Facility: HOSPITAL | Age: 45
End: 2020-11-24

## 2020-11-24 NOTE — PROGRESS NOTES
HTN Report. Attempting to contact pt to obtain a home BP reading and overdue annual exam with PCP. Unable to reach patient at this time. No answer

## 2021-01-25 ENCOUNTER — PATIENT OUTREACH (OUTPATIENT)
Dept: ADMINISTRATIVE | Facility: HOSPITAL | Age: 46
End: 2021-01-25

## 2021-03-01 ENCOUNTER — PATIENT MESSAGE (OUTPATIENT)
Dept: FAMILY MEDICINE | Facility: CLINIC | Age: 46
End: 2021-03-01

## 2021-03-15 ENCOUNTER — OFFICE VISIT (OUTPATIENT)
Dept: FAMILY MEDICINE | Facility: CLINIC | Age: 46
End: 2021-03-15
Payer: COMMERCIAL

## 2021-03-15 VITALS
BODY MASS INDEX: 31.93 KG/M2 | HEIGHT: 74 IN | SYSTOLIC BLOOD PRESSURE: 158 MMHG | TEMPERATURE: 98 F | DIASTOLIC BLOOD PRESSURE: 99 MMHG | WEIGHT: 248.81 LBS | HEART RATE: 67 BPM

## 2021-03-15 DIAGNOSIS — Z00.00 ENCOUNTER FOR ANNUAL HEALTH EXAMINATION: ICD-10-CM

## 2021-03-15 DIAGNOSIS — Z13.220 ENCOUNTER FOR LIPID SCREENING FOR CARDIOVASCULAR DISEASE: ICD-10-CM

## 2021-03-15 DIAGNOSIS — I10 ESSENTIAL HYPERTENSION: Primary | ICD-10-CM

## 2021-03-15 DIAGNOSIS — Z11.59 ENCOUNTER FOR HEPATITIS C SCREENING TEST FOR LOW RISK PATIENT: ICD-10-CM

## 2021-03-15 DIAGNOSIS — Z13.6 ENCOUNTER FOR LIPID SCREENING FOR CARDIOVASCULAR DISEASE: ICD-10-CM

## 2021-03-15 PROCEDURE — 3008F BODY MASS INDEX DOCD: CPT | Mod: CPTII,S$GLB,, | Performed by: INTERNAL MEDICINE

## 2021-03-15 PROCEDURE — 1126F PR PAIN SEVERITY QUANTIFIED, NO PAIN PRESENT: ICD-10-PCS | Mod: S$GLB,,, | Performed by: INTERNAL MEDICINE

## 2021-03-15 PROCEDURE — 99999 PR PBB SHADOW E&M-EST. PATIENT-LVL III: ICD-10-PCS | Mod: PBBFAC,,, | Performed by: INTERNAL MEDICINE

## 2021-03-15 PROCEDURE — 1126F AMNT PAIN NOTED NONE PRSNT: CPT | Mod: S$GLB,,, | Performed by: INTERNAL MEDICINE

## 2021-03-15 PROCEDURE — 3077F PR MOST RECENT SYSTOLIC BLOOD PRESSURE >= 140 MM HG: ICD-10-PCS | Mod: CPTII,S$GLB,, | Performed by: INTERNAL MEDICINE

## 2021-03-15 PROCEDURE — 3008F PR BODY MASS INDEX (BMI) DOCUMENTED: ICD-10-PCS | Mod: CPTII,S$GLB,, | Performed by: INTERNAL MEDICINE

## 2021-03-15 PROCEDURE — 3077F SYST BP >= 140 MM HG: CPT | Mod: CPTII,S$GLB,, | Performed by: INTERNAL MEDICINE

## 2021-03-15 PROCEDURE — 99214 OFFICE O/P EST MOD 30 MIN: CPT | Mod: S$GLB,,, | Performed by: INTERNAL MEDICINE

## 2021-03-15 PROCEDURE — 99999 PR PBB SHADOW E&M-EST. PATIENT-LVL III: CPT | Mod: PBBFAC,,, | Performed by: INTERNAL MEDICINE

## 2021-03-15 PROCEDURE — 3080F DIAST BP >= 90 MM HG: CPT | Mod: CPTII,S$GLB,, | Performed by: INTERNAL MEDICINE

## 2021-03-15 PROCEDURE — 99214 PR OFFICE/OUTPT VISIT, EST, LEVL IV, 30-39 MIN: ICD-10-PCS | Mod: S$GLB,,, | Performed by: INTERNAL MEDICINE

## 2021-03-15 PROCEDURE — 3080F PR MOST RECENT DIASTOLIC BLOOD PRESSURE >= 90 MM HG: ICD-10-PCS | Mod: CPTII,S$GLB,, | Performed by: INTERNAL MEDICINE

## 2021-03-15 RX ORDER — AMLODIPINE BESYLATE 5 MG/1
5 TABLET ORAL DAILY
Qty: 30 TABLET | Refills: 11 | Status: SHIPPED | OUTPATIENT
Start: 2021-03-15 | End: 2021-06-10

## 2021-03-18 ENCOUNTER — PATIENT MESSAGE (OUTPATIENT)
Dept: FAMILY MEDICINE | Facility: CLINIC | Age: 46
End: 2021-03-18

## 2021-03-29 ENCOUNTER — PATIENT OUTREACH (OUTPATIENT)
Dept: ADMINISTRATIVE | Facility: HOSPITAL | Age: 46
End: 2021-03-29

## 2021-05-26 ENCOUNTER — PATIENT MESSAGE (OUTPATIENT)
Dept: FAMILY MEDICINE | Facility: CLINIC | Age: 46
End: 2021-05-26

## 2021-06-01 ENCOUNTER — PATIENT MESSAGE (OUTPATIENT)
Dept: FAMILY MEDICINE | Facility: CLINIC | Age: 46
End: 2021-06-01

## 2021-06-17 ENCOUNTER — PATIENT MESSAGE (OUTPATIENT)
Dept: FAMILY MEDICINE | Facility: CLINIC | Age: 46
End: 2021-06-17

## 2021-06-17 DIAGNOSIS — I10 ESSENTIAL HYPERTENSION: ICD-10-CM

## 2021-06-17 RX ORDER — AMLODIPINE BESYLATE 10 MG/1
10 TABLET ORAL DAILY
Qty: 90 TABLET | Refills: 3 | Status: SHIPPED | OUTPATIENT
Start: 2021-06-17 | End: 2022-06-21

## 2021-07-25 ENCOUNTER — PATIENT MESSAGE (OUTPATIENT)
Dept: FAMILY MEDICINE | Facility: CLINIC | Age: 46
End: 2021-07-25

## 2021-07-28 RX ORDER — LISINOPRIL 5 MG/1
5 TABLET ORAL DAILY
Qty: 90 TABLET | Refills: 0 | Status: SHIPPED | OUTPATIENT
Start: 2021-07-28 | End: 2021-09-27 | Stop reason: SDUPTHER

## 2021-08-06 ENCOUNTER — PATIENT MESSAGE (OUTPATIENT)
Dept: FAMILY MEDICINE | Facility: CLINIC | Age: 46
End: 2021-08-06

## 2021-08-10 ENCOUNTER — PATIENT MESSAGE (OUTPATIENT)
Dept: FAMILY MEDICINE | Facility: CLINIC | Age: 46
End: 2021-08-10

## 2021-08-10 ENCOUNTER — LAB VISIT (OUTPATIENT)
Dept: LAB | Facility: HOSPITAL | Age: 46
End: 2021-08-10
Attending: INTERNAL MEDICINE
Payer: COMMERCIAL

## 2021-08-10 DIAGNOSIS — Z13.220 ENCOUNTER FOR LIPID SCREENING FOR CARDIOVASCULAR DISEASE: ICD-10-CM

## 2021-08-10 DIAGNOSIS — Z00.00 ENCOUNTER FOR ANNUAL HEALTH EXAMINATION: ICD-10-CM

## 2021-08-10 DIAGNOSIS — Z11.59 ENCOUNTER FOR HEPATITIS C SCREENING TEST FOR LOW RISK PATIENT: ICD-10-CM

## 2021-08-10 DIAGNOSIS — Z13.6 ENCOUNTER FOR LIPID SCREENING FOR CARDIOVASCULAR DISEASE: ICD-10-CM

## 2021-08-10 DIAGNOSIS — I10 ESSENTIAL HYPERTENSION: ICD-10-CM

## 2021-08-10 LAB
ALBUMIN SERPL BCP-MCNC: 3.9 G/DL (ref 3.5–5.2)
ALP SERPL-CCNC: 50 U/L (ref 55–135)
ALT SERPL W/O P-5'-P-CCNC: 24 U/L (ref 10–44)
ANION GAP SERPL CALC-SCNC: 9 MMOL/L (ref 8–16)
AST SERPL-CCNC: 19 U/L (ref 10–40)
BILIRUB SERPL-MCNC: 0.8 MG/DL (ref 0.1–1)
BUN SERPL-MCNC: 13 MG/DL (ref 6–20)
CALCIUM SERPL-MCNC: 9.5 MG/DL (ref 8.7–10.5)
CHLORIDE SERPL-SCNC: 101 MMOL/L (ref 95–110)
CHOLEST SERPL-MCNC: 187 MG/DL (ref 120–199)
CHOLEST/HDLC SERPL: 2.8 {RATIO} (ref 2–5)
CO2 SERPL-SCNC: 28 MMOL/L (ref 23–29)
CORTIS SERPL-MCNC: 10.1 UG/DL (ref 4.3–22.4)
CREAT SERPL-MCNC: 0.8 MG/DL (ref 0.5–1.4)
ERYTHROCYTE [DISTWIDTH] IN BLOOD BY AUTOMATED COUNT: 12.1 % (ref 11.5–14.5)
EST. GFR  (AFRICAN AMERICAN): >60 ML/MIN/1.73 M^2
EST. GFR  (NON AFRICAN AMERICAN): >60 ML/MIN/1.73 M^2
GLUCOSE SERPL-MCNC: 93 MG/DL (ref 70–110)
HCT VFR BLD AUTO: 44.6 % (ref 40–54)
HDLC SERPL-MCNC: 66 MG/DL (ref 40–75)
HDLC SERPL: 35.3 % (ref 20–50)
HGB BLD-MCNC: 15.2 G/DL (ref 14–18)
LDLC SERPL CALC-MCNC: 105.6 MG/DL (ref 63–159)
MCH RBC QN AUTO: 30 PG (ref 27–31)
MCHC RBC AUTO-ENTMCNC: 34.1 G/DL (ref 32–36)
MCV RBC AUTO: 88 FL (ref 82–98)
NONHDLC SERPL-MCNC: 121 MG/DL
PLATELET # BLD AUTO: 225 K/UL (ref 150–450)
PMV BLD AUTO: 10 FL (ref 9.2–12.9)
POTASSIUM SERPL-SCNC: 3.9 MMOL/L (ref 3.5–5.1)
PROT SERPL-MCNC: 6.8 G/DL (ref 6–8.4)
RBC # BLD AUTO: 5.07 M/UL (ref 4.6–6.2)
SODIUM SERPL-SCNC: 138 MMOL/L (ref 136–145)
TRIGL SERPL-MCNC: 77 MG/DL (ref 30–150)
TSH SERPL DL<=0.005 MIU/L-ACNC: 2.04 UIU/ML (ref 0.4–4)
WBC # BLD AUTO: 5.8 K/UL (ref 3.9–12.7)

## 2021-08-10 PROCEDURE — 80061 LIPID PANEL: CPT | Performed by: INTERNAL MEDICINE

## 2021-08-10 PROCEDURE — 36415 COLL VENOUS BLD VENIPUNCTURE: CPT | Mod: PO | Performed by: INTERNAL MEDICINE

## 2021-08-10 PROCEDURE — 84443 ASSAY THYROID STIM HORMONE: CPT | Performed by: INTERNAL MEDICINE

## 2021-08-10 PROCEDURE — 85027 COMPLETE CBC AUTOMATED: CPT | Performed by: INTERNAL MEDICINE

## 2021-08-10 PROCEDURE — 82533 TOTAL CORTISOL: CPT | Performed by: INTERNAL MEDICINE

## 2021-08-10 PROCEDURE — 86803 HEPATITIS C AB TEST: CPT | Performed by: INTERNAL MEDICINE

## 2021-08-10 PROCEDURE — 80053 COMPREHEN METABOLIC PANEL: CPT | Performed by: INTERNAL MEDICINE

## 2021-08-11 LAB — HCV AB SERPL QL IA: NEGATIVE

## 2021-09-15 ENCOUNTER — TELEPHONE (OUTPATIENT)
Dept: FAMILY MEDICINE | Facility: CLINIC | Age: 46
End: 2021-09-15

## 2021-09-27 ENCOUNTER — PATIENT MESSAGE (OUTPATIENT)
Dept: FAMILY MEDICINE | Facility: CLINIC | Age: 46
End: 2021-09-27

## 2021-09-27 DIAGNOSIS — I10 ESSENTIAL HYPERTENSION: ICD-10-CM

## 2021-09-27 DIAGNOSIS — F41.0 PANIC DISORDER: Primary | ICD-10-CM

## 2021-09-27 RX ORDER — LISINOPRIL 10 MG/1
10 TABLET ORAL DAILY
Qty: 90 TABLET | Refills: 1 | Status: SHIPPED | OUTPATIENT
Start: 2021-09-27 | End: 2021-10-25 | Stop reason: SDUPTHER

## 2021-10-22 ENCOUNTER — PATIENT MESSAGE (OUTPATIENT)
Dept: FAMILY MEDICINE | Facility: CLINIC | Age: 46
End: 2021-10-22
Payer: COMMERCIAL

## 2021-10-22 DIAGNOSIS — I10 ESSENTIAL HYPERTENSION: ICD-10-CM

## 2021-10-25 ENCOUNTER — PATIENT MESSAGE (OUTPATIENT)
Dept: FAMILY MEDICINE | Facility: CLINIC | Age: 46
End: 2021-10-25
Payer: COMMERCIAL

## 2021-10-25 RX ORDER — LISINOPRIL 20 MG/1
20 TABLET ORAL DAILY
Qty: 90 TABLET | Refills: 1 | Status: SHIPPED | OUTPATIENT
Start: 2021-10-25 | End: 2022-06-07 | Stop reason: SDUPTHER

## 2021-11-24 ENCOUNTER — PATIENT MESSAGE (OUTPATIENT)
Dept: ADMINISTRATIVE | Facility: HOSPITAL | Age: 46
End: 2021-11-24
Payer: COMMERCIAL

## 2021-11-29 ENCOUNTER — PATIENT OUTREACH (OUTPATIENT)
Dept: ADMINISTRATIVE | Facility: HOSPITAL | Age: 46
End: 2021-11-29
Payer: COMMERCIAL

## 2021-12-22 ENCOUNTER — PATIENT OUTREACH (OUTPATIENT)
Dept: ADMINISTRATIVE | Facility: HOSPITAL | Age: 46
End: 2021-12-22
Payer: COMMERCIAL

## 2021-12-22 ENCOUNTER — TELEPHONE (OUTPATIENT)
Dept: FAMILY MEDICINE | Facility: CLINIC | Age: 46
End: 2021-12-22
Payer: COMMERCIAL

## 2021-12-22 ENCOUNTER — PATIENT MESSAGE (OUTPATIENT)
Dept: FAMILY MEDICINE | Facility: CLINIC | Age: 46
End: 2021-12-22
Payer: COMMERCIAL

## 2021-12-22 VITALS — DIASTOLIC BLOOD PRESSURE: 80 MMHG | SYSTOLIC BLOOD PRESSURE: 124 MMHG

## 2021-12-27 ENCOUNTER — PATIENT MESSAGE (OUTPATIENT)
Dept: FAMILY MEDICINE | Facility: CLINIC | Age: 46
End: 2021-12-27
Payer: COMMERCIAL

## 2022-03-04 ENCOUNTER — PATIENT MESSAGE (OUTPATIENT)
Dept: FAMILY MEDICINE | Facility: CLINIC | Age: 47
End: 2022-03-04
Payer: COMMERCIAL

## 2022-03-04 DIAGNOSIS — Z23 IMMUNIZATION DUE: Primary | ICD-10-CM

## 2022-03-04 NOTE — TELEPHONE ENCOUNTER
I have signed for the following orders AND/OR meds.  Please call the patient and ask the patient to schedule the testing AND/OR inform about any medications that were sent. Medications have been sent to pharmacy listed below      Orders Placed This Encounter   Procedures    (In Office Administered) Hepatitis B (Recombinant) Adjuvanted, 2 dose     Standing Status:   Future     Standing Expiration Date:   9/4/2022    (In Office Administered) Hepatitis B (Recombinant) Adjuvanted, 2 dose     Standing Status:   Future     Standing Expiration Date:   9/4/2022    (In Office Administered) Meningococcal Conjugate - MCV4O (MENVEO)     Standing Status:   Future     Standing Expiration Date:   9/4/2022    (In Office Administered) Meningococcal Conjugate - MCV4O (MENVEO)     Standing Status:   Future     Standing Expiration Date:   9/4/2022    RUBEOLA ANTIBODY IGG     Standing Status:   Future     Standing Expiration Date:   5/3/2023    Mumps antibody, IgG     Standing Status:   Future     Standing Expiration Date:   5/3/2023    Rubella Antibody, IgG     Standing Status:   Future     Standing Expiration Date:   5/3/2023    POCT TB Skin Test Read     Standing Status:   Future     Standing Expiration Date:   6/4/2022              [facility][id number] - [place] - [address] [facility] [address] [facility]  [address]  [place] [address]  Phone: [phone] Fax: [phone]    [facility] - [place] - [address] [facility]  [address] [facility] [address]  Phone: [phone] Fax: [phone]    [facility] #[id number] - [place] - [address] [facility] AT [facility]  [address] [facility] [address]  Phone: [phone] Fax: [phone]    [facility][id number] - [place] [address] [facility] AT [facility] [address] [facility]  [address] [facility] [address]  Phone: [phone] Fax: [phone]

## 2022-03-04 NOTE — TELEPHONE ENCOUNTER
Pt will need   -Meningitis vaccine (2) dose, by at least 8 weeks  -Hep B vaccine (2) dose  -PPD  -MMR titers

## 2022-03-07 ENCOUNTER — PATIENT MESSAGE (OUTPATIENT)
Dept: FAMILY MEDICINE | Facility: CLINIC | Age: 47
End: 2022-03-07
Payer: COMMERCIAL

## 2022-04-13 ENCOUNTER — PATIENT MESSAGE (OUTPATIENT)
Dept: FAMILY MEDICINE | Facility: CLINIC | Age: 47
End: 2022-04-13
Payer: COMMERCIAL

## 2022-04-20 DIAGNOSIS — Z01.810 PREOP CARDIOVASCULAR EXAM: Primary | ICD-10-CM

## 2022-04-21 ENCOUNTER — PATIENT MESSAGE (OUTPATIENT)
Dept: FAMILY MEDICINE | Facility: CLINIC | Age: 47
End: 2022-04-21
Payer: COMMERCIAL

## 2022-04-22 ENCOUNTER — HOSPITAL ENCOUNTER (OUTPATIENT)
Dept: CARDIOLOGY | Facility: HOSPITAL | Age: 47
Discharge: HOME | End: 2022-04-22
Payer: COMMERCIAL

## 2022-04-22 DIAGNOSIS — Z01.810 PREOP CARDIOVASCULAR EXAM: ICD-10-CM

## 2022-04-22 PROCEDURE — 93010 ELECTROCARDIOGRAM REPORT: CPT | Mod: ,,, | Performed by: INTERNAL MEDICINE

## 2022-04-22 PROCEDURE — 93005 ELECTROCARDIOGRAM TRACING: CPT | Mod: PO

## 2022-04-22 PROCEDURE — 93010 EKG 12-LEAD: ICD-10-PCS | Mod: ,,, | Performed by: INTERNAL MEDICINE

## 2022-04-27 ENCOUNTER — PATIENT MESSAGE (OUTPATIENT)
Dept: ADMINISTRATIVE | Facility: HOSPITAL | Age: 47
End: 2022-04-27
Payer: COMMERCIAL

## 2022-05-04 ENCOUNTER — PATIENT MESSAGE (OUTPATIENT)
Dept: FAMILY MEDICINE | Facility: CLINIC | Age: 47
End: 2022-05-04
Payer: COMMERCIAL

## 2022-05-04 DIAGNOSIS — Z01.818 PREOPERATIVE CLEARANCE: Primary | ICD-10-CM

## 2022-05-04 NOTE — TELEPHONE ENCOUNTER
I have signed for the following orders AND/OR meds.  Please call the patient and ask the patient to schedule the testing AND/OR inform about any medications that were sent. Medications have been sent to pharmacy listed below      Orders Placed This Encounter   Procedures    Ambulatory referral/consult to Cardiology     Standing Status:   Future     Standing Expiration Date:   6/4/2023     Referral Priority:   Routine     Referral Type:   Consultation     Referral Reason:   Specialty Services Required     Requested Specialty:   Cardiology     Number of Visits Requested:   1              [facility] #[id number] - [place] - [address] [place] [address] & [address] [facility]  [address] [facility] [address]  Phone: [phone] Fax: [phone]    [facility] - [place] - [address] [facility]  [address] [facility] [address]  Phone: [phone] Fax: [phone]    [facility] #[id number] - [place] - [address] [address]  Phone: [phone] Fax: [phone]    [facility][id number] [address] & [address] [address]  Phone: [phone] Fax: [phone]

## 2022-05-05 NOTE — PROGRESS NOTES
Subjective:   Patient ID:  Luke Del Rosario is a 46 y.o. male who presents for evaluation of No chief complaint on file.  This patient presents with history hypertension hyperlipidemia.  History of panic disorder and tachyarrhythmias.    Patient presents the office for preop evaluation for nasal surgery.  The patient is otherwise doing well.  No exertional symptoms chest pain shortness of breath he exercises vigorously daily.  Has no chest discomfort no acute EKG changes prior EKGs in 2007 are unchanged.  Patient is cleared for upcoming procedure he has history hypertension is under good control with medications.    Family History   Problem Relation Age of Onset    Diabetes Father     Diabetes Paternal Aunt     Diabetes Paternal Uncle      Past Medical History:   Diagnosis Date    HLD (hyperlipidemia)     HTN (hypertension) 2/7/2012    Migraines     Panic disorder      Social History     Socioeconomic History    Marital status:    Tobacco Use    Smoking status: Never Smoker    Smokeless tobacco: Never Used   Substance and Sexual Activity    Alcohol use: Yes     Alcohol/week: 6.0 standard drinks     Types: 6 Cans of beer per week    Drug use: No    Sexual activity: Yes     Partners: Female     Birth control/protection: I.U.D.     Current Outpatient Medications on File Prior to Visit   Medication Sig Dispense Refill    amLODIPine (NORVASC) 10 MG tablet Take 1 tablet (10 mg total) by mouth once daily. 90 tablet 3    CINNAMON BARK (CINNAMON ORAL) Take by mouth.        lisinopriL (PRINIVIL,ZESTRIL) 20 MG tablet Take 1 tablet (20 mg total) by mouth once daily. 90 tablet 1    magnesium 30 mg Tab Take by mouth.        multivitamin (ONE DAILY MULTIVITAMIN) per tablet Take 1 tablet by mouth.       No current facility-administered medications on file prior to visit.     Review of patient's allergies indicates:   Allergen Reactions    No known drug allergies        Review of Systems    Constitutional: Negative for chills, diaphoresis, night sweats, weight gain and weight loss.   HENT: Negative for congestion, hoarse voice, sore throat and stridor.    Eyes: Negative for double vision and pain.   Cardiovascular: Negative for chest pain, claudication, cyanosis, dyspnea on exertion, irregular heartbeat, leg swelling, near-syncope, orthopnea, palpitations, paroxysmal nocturnal dyspnea and syncope.   Respiratory: Negative for cough, hemoptysis, shortness of breath, sleep disturbances due to breathing, snoring, sputum production and wheezing.    Endocrine: Negative for cold intolerance, heat intolerance and polydipsia.   Hematologic/Lymphatic: Negative for bleeding problem. Does not bruise/bleed easily.   Skin: Negative for color change, dry skin and rash.   Musculoskeletal: Negative for joint swelling and muscle cramps.   Gastrointestinal: Negative for bloating, abdominal pain, constipation, diarrhea, dysphagia, melena, nausea and vomiting.   Genitourinary: Negative for flank pain and urgency.   Neurological: Negative for dizziness, focal weakness, headaches, light-headedness, loss of balance, seizures and weakness.   Psychiatric/Behavioral: Negative for altered mental status and memory loss. The patient is not nervous/anxious.          Objective:     Physical Exam  Eyes:      Pupils: Pupils are equal, round, and reactive to light.   Neck:      Trachea: No tracheal deviation.   Cardiovascular:      Rate and Rhythm: Normal rate and regular rhythm.      Pulses: Intact distal pulses.           Carotid pulses are 2+ on the right side and 2+ on the left side.       Radial pulses are 2+ on the right side and 2+ on the left side.        Femoral pulses are 2+ on the right side and 2+ on the left side.       Popliteal pulses are 2+ on the right side and 2+ on the left side.        Dorsalis pedis pulses are 2+ on the right side and 2+ on the left side.        Posterior tibial pulses are 2+ on the right side and  2+ on the left side.      Heart sounds: Normal heart sounds. No murmur heard.    No friction rub. No gallop.   Pulmonary:      Effort: Pulmonary effort is normal. No respiratory distress.      Breath sounds: Normal breath sounds. No stridor. No wheezing or rales.   Chest:      Chest wall: No tenderness.   Abdominal:      General: There is no distension.      Tenderness: There is no abdominal tenderness. There is no rebound.   Musculoskeletal:         General: No tenderness.      Cervical back: Normal range of motion.   Skin:     General: Skin is warm and dry.   Neurological:      Mental Status: He is alert and oriented to person, place, and time.       EKG 04/22/2022 reveals normal sinus rhythm evidence minor inferior Q-waves noted.  No other ST T wave changes.  This is unchanged from EKG of 2007  This EKG was personally reviewed by myself, I agree w minor Q-waves which are incidental.  ith the interpretation.     8 mo ago 9 yr ago 10 yr ago 11 yr ago    Cholesterol 120 - 199 mg/dL 187  166 CM  152 CM  185 CM    Comment: The National Cholesterol Education Program (NCEP) has set the   following guidelines (reference ranges) for Cholesterol:   Optimal.....................<200 mg/dL   Borderline High.............200-239 mg/dL   High........................> or = 240 mg/dL    Triglycerides 30 - 150 mg/dL 77  64 CM  57 CM  103 CM    Comment: The National Cholesterol Education Program (NCEP) has set the   following guidelines (reference values) for triglycerides:   Normal......................<150 mg/dL   Borderline High.............150-199 mg/dL   High........................200-499 mg/dL    HDL 40 - 75 mg/dL 66  61 CM  43 CM  37 Low  CM    Comment: The National Cholesterol Education Program (NCEP) has set the   following guidelines (reference values) for HDL Cholesterol:   Low...............<40 mg/dL   Optimal...........>60 mg/dL    LDL Cholesterol 63.0 - 159.0 mg/dL 105.6  92.0 R, CM  98.0 R, CM  127.4 R, CM     Comment: The National Cholesterol Education Program (NCEP) has set the   following guidelines (reference values) for LDL Cholesterol:         Assessment:     1. Hypertension, unspecified type    2. Hyperlipidemia, unspecified hyperlipidemia type    3. Preop cardiovascular exam          Plan:     Impression 1 hypertension under good control today and continues on amlodipine 10 mg daily.  Patient is also on lisinopril 20 mg daily.  Preop evaluation:  The patient has low risk cardiac for events for procedure.  He is at low risk for cardiovascular events and cleared for upcoming ENT surgery.    Patient to return on as needed basis.

## 2022-05-06 ENCOUNTER — OFFICE VISIT (OUTPATIENT)
Dept: CARDIOLOGY | Facility: CLINIC | Age: 47
End: 2022-05-06
Payer: COMMERCIAL

## 2022-05-06 VITALS
HEART RATE: 64 BPM | HEIGHT: 74 IN | BODY MASS INDEX: 33.96 KG/M2 | OXYGEN SATURATION: 98 % | DIASTOLIC BLOOD PRESSURE: 80 MMHG | WEIGHT: 264.63 LBS | SYSTOLIC BLOOD PRESSURE: 134 MMHG | RESPIRATION RATE: 16 BRPM

## 2022-05-06 DIAGNOSIS — Z01.818 PREOPERATIVE CLEARANCE: ICD-10-CM

## 2022-05-06 DIAGNOSIS — E78.5 HYPERLIPIDEMIA, UNSPECIFIED HYPERLIPIDEMIA TYPE: ICD-10-CM

## 2022-05-06 DIAGNOSIS — Z01.810 PREOP CARDIOVASCULAR EXAM: ICD-10-CM

## 2022-05-06 DIAGNOSIS — I10 HYPERTENSION, UNSPECIFIED TYPE: Primary | ICD-10-CM

## 2022-05-06 PROCEDURE — 1159F MED LIST DOCD IN RCRD: CPT | Mod: CPTII,S$GLB,, | Performed by: INTERNAL MEDICINE

## 2022-05-06 PROCEDURE — 1160F RVW MEDS BY RX/DR IN RCRD: CPT | Mod: CPTII,S$GLB,, | Performed by: INTERNAL MEDICINE

## 2022-05-06 PROCEDURE — 3079F DIAST BP 80-89 MM HG: CPT | Mod: CPTII,S$GLB,, | Performed by: INTERNAL MEDICINE

## 2022-05-06 PROCEDURE — 1160F PR REVIEW ALL MEDS BY PRESCRIBER/CLIN PHARMACIST DOCUMENTED: ICD-10-PCS | Mod: CPTII,S$GLB,, | Performed by: INTERNAL MEDICINE

## 2022-05-06 PROCEDURE — 1159F PR MEDICATION LIST DOCUMENTED IN MEDICAL RECORD: ICD-10-PCS | Mod: CPTII,S$GLB,, | Performed by: INTERNAL MEDICINE

## 2022-05-06 PROCEDURE — 3008F BODY MASS INDEX DOCD: CPT | Mod: CPTII,S$GLB,, | Performed by: INTERNAL MEDICINE

## 2022-05-06 PROCEDURE — 4010F PR ACE/ARB THEARPY RXD/TAKEN: ICD-10-PCS | Mod: CPTII,S$GLB,, | Performed by: INTERNAL MEDICINE

## 2022-05-06 PROCEDURE — 3075F SYST BP GE 130 - 139MM HG: CPT | Mod: CPTII,S$GLB,, | Performed by: INTERNAL MEDICINE

## 2022-05-06 PROCEDURE — 99203 PR OFFICE/OUTPT VISIT, NEW, LEVL III, 30-44 MIN: ICD-10-PCS | Mod: S$GLB,,, | Performed by: INTERNAL MEDICINE

## 2022-05-06 PROCEDURE — 99203 OFFICE O/P NEW LOW 30 MIN: CPT | Mod: S$GLB,,, | Performed by: INTERNAL MEDICINE

## 2022-05-06 PROCEDURE — 99999 PR PBB SHADOW E&M-EST. PATIENT-LVL IV: ICD-10-PCS | Mod: PBBFAC,,, | Performed by: INTERNAL MEDICINE

## 2022-05-06 PROCEDURE — 3079F PR MOST RECENT DIASTOLIC BLOOD PRESSURE 80-89 MM HG: ICD-10-PCS | Mod: CPTII,S$GLB,, | Performed by: INTERNAL MEDICINE

## 2022-05-06 PROCEDURE — 99999 PR PBB SHADOW E&M-EST. PATIENT-LVL IV: CPT | Mod: PBBFAC,,, | Performed by: INTERNAL MEDICINE

## 2022-05-06 PROCEDURE — 4010F ACE/ARB THERAPY RXD/TAKEN: CPT | Mod: CPTII,S$GLB,, | Performed by: INTERNAL MEDICINE

## 2022-05-06 PROCEDURE — 3075F PR MOST RECENT SYSTOLIC BLOOD PRESS GE 130-139MM HG: ICD-10-PCS | Mod: CPTII,S$GLB,, | Performed by: INTERNAL MEDICINE

## 2022-05-06 PROCEDURE — 3008F PR BODY MASS INDEX (BMI) DOCUMENTED: ICD-10-PCS | Mod: CPTII,S$GLB,, | Performed by: INTERNAL MEDICINE

## 2022-05-06 RX ORDER — MELOXICAM 15 MG/1
15 TABLET ORAL DAILY
COMMUNITY
Start: 2022-04-25 | End: 2023-05-11

## 2022-05-12 ENCOUNTER — PATIENT MESSAGE (OUTPATIENT)
Dept: FAMILY MEDICINE | Facility: CLINIC | Age: 47
End: 2022-05-12
Payer: COMMERCIAL

## 2022-05-12 NOTE — LETTER
[address] [place] [address]  Phone: [phone]  Fax: [phone]            Patient: Luke Del Rosario  YOB: 1975   Date: 2022     To Whom It May Concern:    Please see the following documentation of COVID-19 test results for Luke Del Rosario, : 1975..     LABORATORY REPORT  Ordered by: Gayatri Galvez  Address: [address] [facility], Zip: [place] [address]  : Consuelo BRAMBILA#: [id number]     Sample Information      Order ID: [id number]     Source: Swab of internal nose  Type: Nasal - anterior nares  Collected: 2022 01:20 PM CDT  Received: 2022  Reported: 2022    Test Ordered  Rapid NAAT Test    Tested For     Result  SARS-Cov-2     Positive       If you have any questions or concerns, or if I can be of further assistance, please do not hesitate to contact me.     Sincerely,    Betsey Mejia MD

## 2022-05-16 ENCOUNTER — PATIENT MESSAGE (OUTPATIENT)
Dept: FAMILY MEDICINE | Facility: CLINIC | Age: 47
End: 2022-05-16
Payer: COMMERCIAL

## 2022-05-27 ENCOUNTER — PATIENT MESSAGE (OUTPATIENT)
Dept: FAMILY MEDICINE | Facility: CLINIC | Age: 47
End: 2022-05-27
Payer: COMMERCIAL

## 2022-06-07 DIAGNOSIS — I10 ESSENTIAL HYPERTENSION: ICD-10-CM

## 2022-06-07 RX ORDER — LISINOPRIL 20 MG/1
20 TABLET ORAL DAILY
Qty: 90 TABLET | Refills: 1 | Status: SHIPPED | OUTPATIENT
Start: 2022-06-07 | End: 2022-11-28

## 2022-06-07 NOTE — TELEPHONE ENCOUNTER
Care Due:                  Date            Visit Type   Department     Provider  --------------------------------------------------------------------------------                                MYCHART                              FOLLOWUP/OF  [facility]  Last Visit: 03-      FICE VISIT   MEDICINE       Betsey Mejia  Next Visit: None Scheduled  None         None Found                                                            Last  Test          Frequency    Reason                     Performed    Due Date  --------------------------------------------------------------------------------    Office Visit  12 months..  amLODIPine, lisinopriL...  03-   03-    CMP.........  12 months..  lisinopriL...............  08-   08-    [employer] Embedded Care Gaps. Reference number: [id number]. 6/07/2022   6:06:06 AM CDT

## 2022-06-16 ENCOUNTER — PATIENT OUTREACH (OUTPATIENT)
Dept: ADMINISTRATIVE | Facility: HOSPITAL | Age: 47
End: 2022-06-16
Payer: COMMERCIAL

## 2022-06-16 NOTE — PROGRESS NOTES
Called patient to schedule overdue PCP appt, Patient's wife answered, left a message with patient's wife.

## 2022-06-19 DIAGNOSIS — I10 ESSENTIAL HYPERTENSION: ICD-10-CM

## 2022-06-19 NOTE — TELEPHONE ENCOUNTER
No new care gaps identified.  [facility] Embedded Care Gaps. Reference number: [id number]. 6/19/2022   3:41:52 AM CDT

## 2022-06-21 ENCOUNTER — PATIENT MESSAGE (OUTPATIENT)
Dept: FAMILY MEDICINE | Facility: CLINIC | Age: 47
End: 2022-06-21
Payer: COMMERCIAL

## 2022-06-21 RX ORDER — AMLODIPINE BESYLATE 10 MG/1
TABLET ORAL
Qty: 90 TABLET | Refills: 0 | Status: SHIPPED | OUTPATIENT
Start: 2022-06-21 | End: 2022-09-23 | Stop reason: SDUPTHER

## 2022-06-21 NOTE — TELEPHONE ENCOUNTER
Refill Routing Note   Medication(s) are not appropriate for processing by [facility] for the following reason(s):      - Patient has not been seen in over 15 months by PCP    [facility] action(s):  Defer          Medication reconciliation completed: No     Appointments  past 12m or future 3m with PCP    Date Provider   Last Visit   3/15/2021 Betsey Mejia MD   Next Visit   Visit date not found Betsey Mejia MD   ED visits in past 90 days: 0        Note composed:9:15 PM 06/20/2022

## 2022-06-21 NOTE — TELEPHONE ENCOUNTER
Request refilled with no additional refills. Please make a follow up appointment with me or Dr. Mejia

## 2022-08-12 ENCOUNTER — PATIENT MESSAGE (OUTPATIENT)
Dept: FAMILY MEDICINE | Facility: CLINIC | Age: 47
End: 2022-08-12
Payer: COMMERCIAL

## 2022-08-15 ENCOUNTER — OFFICE VISIT (OUTPATIENT)
Dept: FAMILY MEDICINE | Facility: CLINIC | Age: 47
End: 2022-08-15
Payer: COMMERCIAL

## 2022-08-15 VITALS
WEIGHT: 261 LBS | HEIGHT: 74 IN | TEMPERATURE: 98 F | HEART RATE: 85 BPM | DIASTOLIC BLOOD PRESSURE: 85 MMHG | SYSTOLIC BLOOD PRESSURE: 120 MMHG | BODY MASS INDEX: 33.5 KG/M2

## 2022-08-15 DIAGNOSIS — R21 RASH: Primary | ICD-10-CM

## 2022-08-15 PROCEDURE — 3079F PR MOST RECENT DIASTOLIC BLOOD PRESSURE 80-89 MM HG: ICD-10-PCS | Mod: CPTII,S$GLB,, | Performed by: PHYSICIAN ASSISTANT

## 2022-08-15 PROCEDURE — 1159F PR MEDICATION LIST DOCUMENTED IN MEDICAL RECORD: ICD-10-PCS | Mod: CPTII,S$GLB,, | Performed by: PHYSICIAN ASSISTANT

## 2022-08-15 PROCEDURE — 99999 PR PBB SHADOW E&M-EST. PATIENT-LVL IV: CPT | Mod: PBBFAC,,, | Performed by: PHYSICIAN ASSISTANT

## 2022-08-15 PROCEDURE — 96372 THER/PROPH/DIAG INJ SC/IM: CPT | Mod: S$GLB,,, | Performed by: PHYSICIAN ASSISTANT

## 2022-08-15 PROCEDURE — 4010F PR ACE/ARB THEARPY RXD/TAKEN: ICD-10-PCS | Mod: CPTII,S$GLB,, | Performed by: PHYSICIAN ASSISTANT

## 2022-08-15 PROCEDURE — 4010F ACE/ARB THERAPY RXD/TAKEN: CPT | Mod: CPTII,S$GLB,, | Performed by: PHYSICIAN ASSISTANT

## 2022-08-15 PROCEDURE — 99999 PR PBB SHADOW E&M-EST. PATIENT-LVL IV: ICD-10-PCS | Mod: PBBFAC,,, | Performed by: PHYSICIAN ASSISTANT

## 2022-08-15 PROCEDURE — 3008F BODY MASS INDEX DOCD: CPT | Mod: CPTII,S$GLB,, | Performed by: PHYSICIAN ASSISTANT

## 2022-08-15 PROCEDURE — 99213 PR OFFICE/OUTPT VISIT, EST, LEVL III, 20-29 MIN: ICD-10-PCS | Mod: 25,S$GLB,, | Performed by: PHYSICIAN ASSISTANT

## 2022-08-15 PROCEDURE — 96372 PR INJECTION,THERAP/PROPH/DIAG2ST, IM OR SUBCUT: ICD-10-PCS | Mod: S$GLB,,, | Performed by: PHYSICIAN ASSISTANT

## 2022-08-15 PROCEDURE — 1159F MED LIST DOCD IN RCRD: CPT | Mod: CPTII,S$GLB,, | Performed by: PHYSICIAN ASSISTANT

## 2022-08-15 PROCEDURE — 3008F PR BODY MASS INDEX (BMI) DOCUMENTED: ICD-10-PCS | Mod: CPTII,S$GLB,, | Performed by: PHYSICIAN ASSISTANT

## 2022-08-15 PROCEDURE — 3074F SYST BP LT 130 MM HG: CPT | Mod: CPTII,S$GLB,, | Performed by: PHYSICIAN ASSISTANT

## 2022-08-15 PROCEDURE — 3079F DIAST BP 80-89 MM HG: CPT | Mod: CPTII,S$GLB,, | Performed by: PHYSICIAN ASSISTANT

## 2022-08-15 PROCEDURE — 99213 OFFICE O/P EST LOW 20 MIN: CPT | Mod: 25,S$GLB,, | Performed by: PHYSICIAN ASSISTANT

## 2022-08-15 PROCEDURE — 3074F PR MOST RECENT SYSTOLIC BLOOD PRESSURE < 130 MM HG: ICD-10-PCS | Mod: CPTII,S$GLB,, | Performed by: PHYSICIAN ASSISTANT

## 2022-08-15 RX ORDER — TRIAMCINOLONE ACETONIDE 1 MG/G
CREAM TOPICAL 2 TIMES DAILY
Qty: 30 G | Refills: 0 | Status: SHIPPED | OUTPATIENT
Start: 2022-08-15 | End: 2023-08-29

## 2022-08-15 RX ORDER — DEXAMETHASONE SODIUM PHOSPHATE 4 MG/ML
8 INJECTION, SOLUTION INTRA-ARTICULAR; INTRALESIONAL; INTRAMUSCULAR; INTRAVENOUS; SOFT TISSUE ONCE
Status: COMPLETED | OUTPATIENT
Start: 2022-08-15 | End: 2022-08-15

## 2022-08-15 RX ADMIN — DEXAMETHASONE SODIUM PHOSPHATE 8 MG: 4 INJECTION, SOLUTION INTRA-ARTICULAR; INTRALESIONAL; INTRAMUSCULAR; INTRAVENOUS; SOFT TISSUE at 04:08

## 2022-08-15 NOTE — PROGRESS NOTES
Assessment/Plan:    Problem List Items Addressed This Visit        Derm    Rash - Primary    Overview     -decadron IM today  -start topical steroids as prescribed   -avoid touching/scratching  -follow up if no improvement or worsening of symptoms             Relevant Medications    dexamethasone injection 8 mg (Completed)    triamcinolone acetonide 0.1% (KENALOG) 0.1 % cream          Follow up if symptoms worsen or fail to improve.    Lin Hinson PA-C  _____________________________________________________________________________________________________________________________________________________    CC: rash    HPI: Patient is in clinic today as an established patient here for rash involving the upper arms and thighs. Rash started about 4 days ago and has worsened since that time. Patient reports initially noticing the rash after getting out of the shower. Appearance of rash at onset: small red patches. He denies pain or pruritis. He denies fever, chills, sore throat, congestion, cough, abdominal pain, nausea, vomiting, headache, irritability, or myalgias. He has not had previous evaluation of rash. Patient has not had previous treatment. He has not had contacts with similar rash. Patient has not identified precipitant. He denies new exposures (soaps, lotions, laundry detergents, foods, medications, plants, insects or animals). No other complaints today.     Past Medical History:   Diagnosis Date    HLD (hyperlipidemia)     HTN (hypertension) 2/7/2012    Migraines     Panic disorder      Past Surgical History:   Procedure Laterality Date    ARTHROSCOPY OF KNEE Right      Review of patient's allergies indicates:   Allergen Reactions    No known drug allergies      Social History     Tobacco Use    Smoking status: Never Smoker    Smokeless tobacco: Never Used   Substance Use Topics    Alcohol use: Yes     Alcohol/week: 6.0 standard drinks     Types: 6 Cans of beer per week    Drug use: No     Family  "History   Problem Relation Age of Onset    Diabetes Father     Diabetes Paternal Aunt     Diabetes Paternal Uncle      Current Outpatient Medications on File Prior to Visit   Medication Sig Dispense Refill    amLODIPine (NORVASC) 10 MG tablet TAKE 1 TABLET(10 MG) BY MOUTH EVERY DAY 90 tablet 0    CINNAMON BARK (CINNAMON ORAL) Take by mouth.      lisinopriL (PRINIVIL,ZESTRIL) 20 MG tablet Take 1 tablet (20 mg total) by mouth once daily. 90 tablet 1    magnesium 30 mg Tab Take by mouth.      meloxicam (MOBIC) 15 MG tablet Take 15 mg by mouth once daily.      multivitamin (THERAGRAN) per tablet Take 1 tablet by mouth.       No current facility-administered medications on file prior to visit.       Review of Systems   Constitutional: Negative for chills, diaphoresis, fatigue and fever.   HENT: Negative for congestion, ear pain, postnasal drip, rhinorrhea, sinus pain and sore throat.    Eyes: Negative for pain and redness.   Respiratory: Negative for cough, chest tightness and shortness of breath.    Cardiovascular: Negative for chest pain and leg swelling.   Gastrointestinal: Negative for abdominal pain, constipation, diarrhea, nausea and vomiting.   Genitourinary: Negative for dysuria and hematuria.   Musculoskeletal: Negative for arthralgias and joint swelling.   Skin: Positive for rash.   Neurological: Negative for dizziness, syncope and headaches.       Vitals:    08/15/22 1523   BP: 120/85   Pulse: 85   Temp: 98 °F (36.7 °C)   TempSrc: Oral   Weight: 118.4 kg (261 lb)   Height: 6' 2" (1.88 m)       Wt Readings from Last 3 Encounters:   08/15/22 118.4 kg (261 lb)   05/06/22 120 kg (264 lb 9.6 oz)   03/15/21 112.9 kg (248 lb 12.8 oz)       Physical Exam  Constitutional:       General: He is not in acute distress.     Appearance: Normal appearance. He is well-developed.   HENT:      Head: Normocephalic and atraumatic.   Eyes:      Conjunctiva/sclera: Conjunctivae normal.   Cardiovascular:      Rate and " Rhythm: Normal rate and regular rhythm.      Pulses: Normal pulses.      Heart sounds: Normal heart sounds. No murmur heard.  Pulmonary:      Effort: Pulmonary effort is normal. No respiratory distress.      Breath sounds: Normal breath sounds.   Abdominal:      General: Bowel sounds are normal. There is no distension.      Palpations: Abdomen is soft.      Tenderness: There is no abdominal tenderness.   Musculoskeletal:         General: Normal range of motion.      Cervical back: Normal range of motion and neck supple.   Skin:     General: Skin is warm and dry.      Findings: Rash present. Rash is papular. Rash is not scaling.      Comments: Erythematous papules and patches located on bilateral upper arms and thighs.   Neurological:      General: No focal deficit present.      Mental Status: He is alert and oriented to person, place, and time.         Health Maintenance   Topic Date Due    Lipid Panel  08/10/2026    TETANUS VACCINE  05/10/2029    Hepatitis C Screening  Completed

## 2022-08-29 ENCOUNTER — PATIENT MESSAGE (OUTPATIENT)
Dept: FAMILY MEDICINE | Facility: CLINIC | Age: 47
End: 2022-08-29
Payer: COMMERCIAL

## 2022-08-29 ENCOUNTER — TELEPHONE (OUTPATIENT)
Dept: DERMATOLOGY | Facility: CLINIC | Age: 47
End: 2022-08-29
Payer: COMMERCIAL

## 2022-08-29 DIAGNOSIS — R21 RASH: Primary | ICD-10-CM

## 2022-08-29 NOTE — TELEPHONE ENCOUNTER
I have signed for the following orders AND/OR meds.  Please call the patient and ask the patient to schedule the testing AND/OR inform about any medications that were sent. Medications have been sent to pharmacy listed below      Orders Placed This Encounter   Procedures    Ambulatory referral/consult to Dermatology     Standing Status:   Future     Standing Expiration Date:   9/29/2023     Referral Priority:   Routine     Referral Type:   Consultation     Referral Reason:   Specialty Services Required     Requested Specialty:   Dermatology     Number of Visits Requested:   1              [id number] - [place] - [address] AT [place] [address] & [address] [facility]  [address] [facility] [place] [address]  Phone: [phone] Fax: [phone]    [facility] [place] - [address] [address]  Phone: [phone] Fax: [phone]    [id number] - [address] & [address] [address]  Phone: [phone] Fax: [phone]

## 2022-08-29 NOTE — TELEPHONE ENCOUNTER
Tried to reach pt. No answer, will try again later and I left a message on answering machine.    Contacting to inform pt of openings in [place], [place] and [place].

## 2022-08-29 NOTE — TELEPHONE ENCOUNTER
----- Message from Sofiya Rosales sent at 8/29/2022  1:51 PM CDT -----  Contact: Patient  Type:  Apoointment Request      Name of Caller:Patient     When is the first available appointment?n/a     Symptoms: Rash     Would the patient rather a call back or a response via [facility]? Call     Best Call Back Number: [phone] (home)      Additional Information:

## 2022-09-23 ENCOUNTER — PATIENT MESSAGE (OUTPATIENT)
Dept: FAMILY MEDICINE | Facility: CLINIC | Age: 47
End: 2022-09-23
Payer: COMMERCIAL

## 2022-09-23 DIAGNOSIS — I10 ESSENTIAL HYPERTENSION: ICD-10-CM

## 2022-09-23 RX ORDER — AMLODIPINE BESYLATE 10 MG/1
10 TABLET ORAL DAILY
Qty: 90 TABLET | Refills: 3 | Status: SHIPPED | OUTPATIENT
Start: 2022-09-23 | End: 2023-09-25

## 2022-09-23 NOTE — TELEPHONE ENCOUNTER
Care Due:                  Date            Visit Type   Department     Provider  --------------------------------------------------------------------------------                                MYCHART                              FOLLOWUP/OF  Psychiatric FAMILY  Last Visit: 03-      FICE VISIT   MEDICINE       Betsey Mejia  Next Visit: None Scheduled  None         None Found                                                            Last  Test          Frequency    Reason                     Performed    Due Date  --------------------------------------------------------------------------------    Office Visit  12 months..  lisinopriL...............  03-   03-    CMP.........  12 months..  lisinopriL...............  08-   08-    [employer] Embedded Care Gaps. Reference number: [id number]. 9/23/2022   1:28:05 PM CDT

## 2022-09-23 NOTE — TELEPHONE ENCOUNTER
Refill Routing Note   Medication(s) are not appropriate for processing by [facility] for the following reason(s):      - Patient has not been seen in over 15 months by PCP    [facility] action(s):  Defer Medication-related problems identified:   Requires labs  Requires appointment        Medication reconciliation completed: No     Appointments  past 12m or future 3m with PCP    Date Provider   Last Visit   3/15/2021 Betsey Mejia MD   Next Visit   Visit date not found Betsey Mejia MD   ED visits in past 90 days: 0        Note composed:2:49 PM 09/23/2022

## 2023-02-15 LAB
CHOL/HDLC RATIO: 3.1
CHOLESTEROL, TOTAL: 209 MG/DL (ref 100–200)
HDLC SERPL-MCNC: 67 MG/DL
LDLC SERPL CALC-MCNC: 105 MG/DL
TRIGL SERPL-MCNC: 182 MG/DL
VLDLC SERPL-MCNC: 36 MG/DL (ref 5–40)

## 2023-02-27 ENCOUNTER — PATIENT OUTREACH (OUTPATIENT)
Dept: ADMINISTRATIVE | Facility: HOSPITAL | Age: 48
End: 2023-02-27
Payer: COMMERCIAL

## 2023-04-28 ENCOUNTER — PATIENT MESSAGE (OUTPATIENT)
Dept: FAMILY MEDICINE | Facility: CLINIC | Age: 48
End: 2023-04-28
Payer: COMMERCIAL

## 2023-05-10 ENCOUNTER — HOSPITAL ENCOUNTER (OUTPATIENT)
Dept: RADIOLOGY | Facility: HOSPITAL | Age: 48
Discharge: HOME | End: 2023-05-10
Attending: NURSE PRACTITIONER
Payer: COMMERCIAL

## 2023-05-10 ENCOUNTER — OFFICE VISIT (OUTPATIENT)
Dept: FAMILY MEDICINE | Facility: CLINIC | Age: 48
End: 2023-05-10
Payer: COMMERCIAL

## 2023-05-10 VITALS
WEIGHT: 269 LBS | HEIGHT: 74 IN | BODY MASS INDEX: 34.52 KG/M2 | SYSTOLIC BLOOD PRESSURE: 136 MMHG | OXYGEN SATURATION: 97 % | DIASTOLIC BLOOD PRESSURE: 80 MMHG | HEART RATE: 79 BPM

## 2023-05-10 DIAGNOSIS — M79.604 LOWER EXTREMITY PAIN, RIGHT: ICD-10-CM

## 2023-05-10 DIAGNOSIS — M79.604 LOWER EXTREMITY PAIN, RIGHT: Primary | ICD-10-CM

## 2023-05-10 PROCEDURE — 93971 EXTREMITY STUDY: CPT | Mod: TC,PO,RT

## 2023-05-10 PROCEDURE — 99213 PR OFFICE/OUTPT VISIT, EST, LEVL III, 20-29 MIN: ICD-10-PCS | Mod: S$GLB,,, | Performed by: NURSE PRACTITIONER

## 2023-05-10 PROCEDURE — 93971 EXTREMITY STUDY: CPT | Mod: 26,RT,, | Performed by: RADIOLOGY

## 2023-05-10 PROCEDURE — 99999 PR PBB SHADOW E&M-EST. PATIENT-LVL IV: CPT | Mod: PBBFAC,,, | Performed by: NURSE PRACTITIONER

## 2023-05-10 PROCEDURE — 99999 PR PBB SHADOW E&M-EST. PATIENT-LVL IV: ICD-10-PCS | Mod: PBBFAC,,, | Performed by: NURSE PRACTITIONER

## 2023-05-10 PROCEDURE — 93971 US LOWER EXTREMITY VEINS RIGHT: ICD-10-PCS | Mod: 26,RT,, | Performed by: RADIOLOGY

## 2023-05-10 PROCEDURE — 99213 OFFICE O/P EST LOW 20 MIN: CPT | Mod: S$GLB,,, | Performed by: NURSE PRACTITIONER

## 2023-05-10 RX ORDER — MAGNESIUM 30 MG
30 TABLET ORAL DAILY
COMMUNITY
End: 2023-05-11

## 2023-05-10 RX ORDER — NAPROXEN 500 MG/1
1 TABLET ORAL 2 TIMES DAILY
COMMUNITY
Start: 2022-10-20 | End: 2023-05-11 | Stop reason: SDUPTHER

## 2023-05-10 RX ORDER — MULTIVITAMIN
1 TABLET ORAL DAILY
COMMUNITY
End: 2023-05-11

## 2023-05-10 NOTE — PROGRESS NOTES
Assessment/Plan:  Problem List Items Addressed This Visit    None  Visit Diagnoses       Lower extremity pain, right    -  Primary    Relevant Medications    naproxen (NAPROSYN) 500 MG tablet    Other Relevant Orders    US Lower Extremity Veins Right (Completed)          U/S RLE negative for acute DVT  Discussed condition course and signs and symptoms to expect.  Patient advised take anti-inflammatories and or Tylenol for pain or fever. Supportive care- rest, ice, heat, avoid heavy lifting, limit strenuous activity. Call MD or follow-up to clinic if not improving or worsening symptoms.  Follow up if symptoms worsen or fail to improve.  ER precautions for severe or worsening symptoms.     Karrie Persaud NP  _____________________________________________________________________________________________________________________________________________________    CC: leg pain     HPI: Patient is a 47-year-old male who presents in clinic today as an established patient here for leg pain. Located to right lower calf area. Onset x1 week ago. Intermittent. Worse with walking, stretching. Described as aching pain. Reports there has been no redness or warmth. He reports slight tenderness with palpation. He denies recent surgery. Notes last surgery was 10/2022 on left knee. He has recently travelled. He states that he flew to [facility] (9hr flight) in April and recently returned from a flight to [place] last weekend. Denies changes in activity while on vacation. States that he travelled for work and was mostly in meetings while away. He is concerned about a DVT. No known personal history of DVT. He is not on any hormonal replacement therapies. His father has a history of DVT/PE. He has no known underlying bleeding or clotting disorders.     US Lower Extremity Veins Right  Narrative: EXAM:  US LOWER EXTREMITY VEINS RIGHT    CLINICAL HISTORY:  Right leg pain    FINDINGS: Duplex and color flow imaging with spectral wave analysis  performed. The right common femoral, superficial femoral, deep femoral, popliteal and visualized calf veins are patent and compressible with normal response to augmentation.  The left common femoral vein demonstrates normal compression.  Impression:  No evidence of DVT in the right lower extremity.    Finalized on: 5/10/2023 3:11 PM By:  Guille Rushing MD  [facility]# [id number]      2023-05-10 15:13:14.659    [facility]    Past Medical History:  Past Medical History:   Diagnosis Date    HLD (hyperlipidemia)     HTN (hypertension) 2/7/2012    Migraines     Panic disorder      Past Surgical History:   Procedure Laterality Date    ARTHROSCOPY OF KNEE Right      Review of patient's allergies indicates:   Allergen Reactions    No known drug allergies      Social History     Tobacco Use    Smoking status: Never    Smokeless tobacco: Never   Substance Use Topics    Alcohol use: Yes     Alcohol/week: 6.0 standard drinks     Types: 6 Cans of beer per week    Drug use: No     Family History   Problem Relation Age of Onset    Diabetes Father     Diabetes Paternal Aunt     Diabetes Paternal Uncle      Current Outpatient Medications on File Prior to Visit   Medication Sig Dispense Refill    amLODIPine (NORVASC) 10 MG tablet Take 1 tablet (10 mg total) by mouth once daily. 90 tablet 3    CINNAMON BARK (CINNAMON ORAL) Take by mouth.      lisinopriL (PRINIVIL,ZESTRIL) 20 MG tablet TAKE 1 TABLET(20 MG) BY MOUTH EVERY DAY 90 tablet 1    magnesium 30 mg Tab Take by mouth.      multivitamin (THERAGRAN) per tablet Take 1 tablet by mouth.      triamcinolone acetonide 0.1% (KENALOG) 0.1 % cream Apply topically 2 (two) times daily. 30 g 0    [DISCONTINUED] magnesium 30 mg Tab Take 30 mg by mouth once daily.      [DISCONTINUED] meloxicam (MOBIC) 15 MG tablet Take 15 mg by mouth once daily.      [DISCONTINUED] multivitamin (THERAGRAN) per tablet Take 1 tablet by mouth once daily.      [DISCONTINUED] naproxen (NAPROSYN) 500 MG tablet Take 1 tablet by  "mouth 2 (two) times daily.       No current facility-administered medications on file prior to visit.     Review of Systems   Constitutional:  Negative for activity change, chills, fatigue and fever.   HENT:  Negative for congestion, rhinorrhea and sore throat.    Eyes:  Positive for visual disturbance.   Respiratory:  Negative for chest tightness, shortness of breath and wheezing.    Cardiovascular:  Negative for chest pain, palpitations and leg swelling.   Gastrointestinal:  Negative for abdominal pain, diarrhea and vomiting.   Musculoskeletal:  Positive for arthralgias and myalgias.   Skin:  Negative for color change, rash and wound.   Neurological:  Negative for dizziness and headaches.   Psychiatric/Behavioral:  Negative for behavioral problems. The patient is not nervous/anxious.      Vitals:    05/10/23 1416   BP: 136/80   BP Location: Right arm   Pulse: 79   SpO2: 97%   Weight: 122 kg (269 lb)   Height: 6' 2" (1.88 m)     Wt Readings from Last 3 Encounters:   05/10/23 122 kg (269 lb)   08/15/22 118.4 kg (261 lb)   05/06/22 120 kg (264 lb 9.6 oz)     Physical Exam  Vitals reviewed.   Constitutional:       General: He is not in acute distress.     Appearance: Normal appearance. He is not ill-appearing.   HENT:      Head: Normocephalic and atraumatic.      Right Ear: External ear normal.      Left Ear: External ear normal.      Nose: Nose normal.   Eyes:      Extraocular Movements: Extraocular movements intact.      Conjunctiva/sclera: Conjunctivae normal.   Cardiovascular:      Rate and Rhythm: Normal rate.      Heart sounds: Normal heart sounds.   Pulmonary:      Effort: Pulmonary effort is normal. No respiratory distress.      Breath sounds: Normal breath sounds.   Abdominal:      General: Abdomen is flat. There is no distension.   Musculoskeletal:         General: Normal range of motion.      Cervical back: Normal range of motion.      Right lower leg: No swelling or tenderness.      Left lower leg: No " swelling or tenderness.      Comments: No redness, warmth, swelling to BLE. He reports mild tenderness to RLE on palpation. No obvious signs of injury.    Skin:     General: Skin is warm and dry.      Capillary Refill: Capillary refill takes less than 2 seconds.      Coloration: Skin is not pale.      Findings: No bruising, ecchymosis, erythema, signs of injury or rash.   Neurological:      General: No focal deficit present.      Mental Status: He is alert and oriented to person, place, and time. Mental status is at baseline.   Psychiatric:         Mood and Affect: Mood normal.         Speech: Speech normal. Speech is not rapid and pressured, delayed or slurred.         Behavior: Behavior normal. Behavior is not agitated, slowed, aggressive, withdrawn, hyperactive or combative. Behavior is cooperative.         Thought Content: Thought content normal.         Judgment: Judgment normal.     Health Maintenance   Topic Date Due    Lipid Panel  02/15/2028    TETANUS VACCINE  05/10/2029    Hepatitis C Screening  Completed

## 2023-05-11 RX ORDER — NAPROXEN 500 MG/1
500 TABLET ORAL 2 TIMES DAILY PRN
Qty: 30 TABLET | Refills: 0 | Status: SHIPPED | OUTPATIENT
Start: 2023-05-11

## 2023-06-10 DIAGNOSIS — I10 ESSENTIAL HYPERTENSION: ICD-10-CM

## 2023-06-10 NOTE — TELEPHONE ENCOUNTER
Care Due:                  Date            Visit Type   Department     Provider  --------------------------------------------------------------------------------    Last Visit: None Found      None         None Found  Next Visit: None Scheduled  None         None Found                                                            Last  Test          Frequency    Reason                     Performed    Due Date  --------------------------------------------------------------------------------    Office Visit  12 months..  amLODIPine, lisinopriL...  Not Found    Overdue    CMP.........  12 months..  lisinopriL...............  08-   08-    [facility] Embedded Care Due Messages. Reference number: [id number].   6/10/2023 4:22:42 PM CDT

## 2023-06-13 RX ORDER — LISINOPRIL 20 MG/1
TABLET ORAL
Qty: 90 TABLET | Refills: 3 | Status: SHIPPED | OUTPATIENT
Start: 2023-06-13

## 2023-08-29 ENCOUNTER — PATIENT MESSAGE (OUTPATIENT)
Dept: PRIMARY CARE CLINIC | Facility: CLINIC | Age: 48
End: 2023-08-29

## 2023-08-29 ENCOUNTER — OFFICE VISIT (OUTPATIENT)
Dept: PRIMARY CARE CLINIC | Facility: CLINIC | Age: 48
End: 2023-08-29
Payer: COMMERCIAL
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
## 2023-08-29 DIAGNOSIS — R21 SKIN RASH: Primary | ICD-10-CM

## 2023-08-29 PROCEDURE — 99213 PR OFFICE/OUTPT VISIT, EST, LEVL III, 20-29 MIN: ICD-10-PCS | Mod: 95,,, | Performed by: INTERNAL MEDICINE

## 2023-08-29 PROCEDURE — 99213 OFFICE O/P EST LOW 20 MIN: CPT | Mod: 95,,, | Performed by: INTERNAL MEDICINE

## 2023-08-29 RX ORDER — METHYLPREDNISOLONE 4 MG/1
TABLET ORAL
Qty: 21 TABLET | Refills: 0 | Status: SHIPPED | OUTPATIENT
Start: 2023-08-29
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
## 2023-09-07 ENCOUNTER — OFFICE VISIT (OUTPATIENT)
Dept: FAMILY MEDICINE | Facility: CLINIC | Age: 48
End: 2023-09-07
Payer: COMMERCIAL

## 2023-09-07 VITALS
WEIGHT: 265 LBS | HEART RATE: 69 BPM | HEIGHT: 74 IN | BODY MASS INDEX: 34.01 KG/M2 | DIASTOLIC BLOOD PRESSURE: 82 MMHG | SYSTOLIC BLOOD PRESSURE: 124 MMHG | TEMPERATURE: 99 F

## 2023-09-07 DIAGNOSIS — Z13.220 ENCOUNTER FOR LIPID SCREENING FOR CARDIOVASCULAR DISEASE: ICD-10-CM

## 2023-09-07 DIAGNOSIS — E66.9 OBESITY (BMI 30-39.9): ICD-10-CM

## 2023-09-07 DIAGNOSIS — Z00.00 ENCOUNTER FOR ANNUAL HEALTH EXAMINATION: Primary | ICD-10-CM

## 2023-09-07 DIAGNOSIS — Z13.1 DIABETES MELLITUS SCREENING: ICD-10-CM

## 2023-09-07 DIAGNOSIS — Z13.6 ENCOUNTER FOR LIPID SCREENING FOR CARDIOVASCULAR DISEASE: ICD-10-CM

## 2023-09-07 DIAGNOSIS — I10 PRIMARY HYPERTENSION: ICD-10-CM

## 2023-09-07 PROCEDURE — 99396 PREV VISIT EST AGE 40-64: CPT | Mod: S$GLB,,, | Performed by: INTERNAL MEDICINE

## 2023-09-07 PROCEDURE — 99396 PR PREVENTIVE VISIT,EST,40-64: ICD-10-PCS | Mod: S$GLB,,, | Performed by: INTERNAL MEDICINE

## 2023-09-07 PROCEDURE — 99999 PR PBB SHADOW E&M-EST. PATIENT-LVL III: ICD-10-PCS | Mod: PBBFAC,,, | Performed by: INTERNAL MEDICINE

## 2023-09-07 PROCEDURE — 99999 PR PBB SHADOW E&M-EST. PATIENT-LVL III: CPT | Mod: PBBFAC,,, | Performed by: INTERNAL MEDICINE

## 2023-09-07 RX ORDER — PHENTERMINE HYDROCHLORIDE 37.5 MG/1
37.5 TABLET ORAL
Qty: 30 TABLET | Refills: 2 | Status: SHIPPED | OUTPATIENT
Start: 2023-09-07 | End: 2023-12-06

## 2023-09-07 NOTE — PROGRESS NOTES
This note is specifically for wellness visit performed today.       Assessment / Plan:      Patient here for annual wellness exam. Health maintenance was reviewed and ordered.    Complete history and physical was completed today.  Complete and thorough medication reconciliation was performed.  Discussed risks and benefits of medications.  Advised patient on orders and health maintenance. Continue current medications listed on your summary sheet.    All questions were answered. Patient had no further concerns. Advised of diagnoses and plan.     Problem List Items Addressed This Visit          Cardiac/Vascular    HTN (hypertension)    Overview     Hypertension Medications               amLODIPine (NORVASC) 10 MG tablet Take 1 tablet (10 mg total) by mouth once daily.    lisinopriL (PRINIVIL,ZESTRIL) 20 MG tablet TAKE 1 TABLET(20 MG) BY MOUTH EVERY DAY   -at goal today  -continue lifestyle modification with low sodium diet and exercise   -discussed hypertension disease course and importance of treating high blood pressure  -patient understood and advised of risk of untreated blood pressure.  ER precautions were given   for symptoms of hypertensive urgency and emergency.            Endocrine    Obesity (BMI 30-39.9)    Overview     General weight loss/lifestyle modification strategies discussed (elicit support from others; identify saboteurs; non-food rewards, etc).  Informal exercise measures discussed, e.g. taking stairs instead of elevator.  Regular aerobic exercise program discussed.  Medication: starting on adipex.   Medication information provided, including potential AE           Relevant Medications    phentermine (ADIPEX-P) 37.5 mg tablet     Other Visit Diagnoses       Encounter for annual health examination    -  Primary    Relevant Orders    CBC Without Differential    Comprehensive Metabolic Panel    Lipid Panel    Hemoglobin A1C    Encounter for lipid screening for cardiovascular disease        Relevant  Orders    Lipid Panel    Diabetes mellitus screening        Relevant Orders    Hemoglobin A1C            Follow up for labs when available: cbc,cmp,lipid,a1c.  Discussed need for c-scope.    Betsey Mejia MD       WELLNESS EXAM      Patient ID: Luke Del Rosario is a 47 y.o. male.  has a past medical history of HLD (hyperlipidemia), HTN (hypertension) (2/7/2012), Migraines, and Panic disorder.     Chief Complaint:  Encounter for wellness exam    Well Adult Physical: Patient here for a comprehensive physical exam.     Health Maintenance Topics with due status: Not Due       Topic Last Completion Date    TETANUS VACCINE 05/10/2019    Lipid Panel 02/15/2023        ==============================================    Health Maintenance Due   Topic Date Due    HIV Screening  Never done    Hemoglobin A1c (Diabetic Prevention Screening)  Never done    Colorectal Cancer Screening  Never done    Influenza Vaccine (1) 09/01/2023       Past Medical History:  Past Medical History:   Diagnosis Date    HLD (hyperlipidemia)     HTN (hypertension) 2/7/2012    Migraines     Panic disorder      Past Surgical History:   Procedure Laterality Date    ARTHROSCOPY OF KNEE Right      Review of patient's allergies indicates:   Allergen Reactions    No known drug allergies      Current Outpatient Medications on File Prior to Visit   Medication Sig Dispense Refill    amLODIPine (NORVASC) 10 MG tablet Take 1 tablet (10 mg total) by mouth once daily. 90 tablet 3    CINNAMON BARK (CINNAMON ORAL) Take by mouth.      lisinopriL (PRINIVIL,ZESTRIL) 20 MG tablet TAKE 1 TABLET(20 MG) BY MOUTH EVERY DAY 90 tablet 3    magnesium 30 mg Tab Take by mouth.      meloxicam (MOBIC) 15 MG tablet TAKE 1 TABLET(15 MG) BY MOUTH DAILY      multivitamin (THERAGRAN) per tablet Take 1 tablet by mouth.      naproxen (NAPROSYN) 500 MG tablet Take 1 tablet (500 mg total) by mouth 2 (two) times daily as needed (pain). 30 tablet 0    triamcinolone  acetonide 0.1% (KENALOG) 0.1 % ointment Apply to AA on body BID x 2 weeks, PRN flares 80 g 2    methylPREDNISolone (MEDROL DOSEPACK) 4 mg tablet follow package directions (Patient not taking: Reported on 9/7/2023) 21 tablet 0     No current facility-administered medications on file prior to visit.     Social History     Socioeconomic History    Marital status:    Tobacco Use    Smoking status: Never    Smokeless tobacco: Never   Substance and Sexual Activity    Alcohol use: Yes     Alcohol/week: 6.0 standard drinks of alcohol     Types: 6 Cans of beer per week    Drug use: No    Sexual activity: Yes     Partners: Female     Birth control/protection: I.U.D.     Family History   Problem Relation Age of Onset    Diabetes Father     Diabetes Paternal Aunt     Diabetes Paternal Uncle        Review of Systems   Constitutional:  Negative for chills, fever and malaise/fatigue.   HENT:  Negative for hearing loss.    Eyes:  Negative for discharge.   Respiratory:  Negative for cough, shortness of breath and wheezing.    Cardiovascular:  Negative for chest pain, palpitations and leg swelling.   Gastrointestinal:  Negative for abdominal pain, blood in stool, constipation, diarrhea and vomiting.   Genitourinary:  Negative for dysuria, frequency, hematuria and urgency.   Musculoskeletal:  Negative for back pain, joint pain and neck pain.   Neurological:  Negative for weakness and headaches.   Endo/Heme/Allergies:  Negative for polydipsia.   Psychiatric/Behavioral:  Negative for depression. The patient is not nervous/anxious.          Objective:      Vitals:    09/07/23 1256   BP: 124/82   Pulse: 69   Temp: 98.6 °F (37 °C)     Body mass index is 34.02 kg/m².     Physical Exam  Constitutional:       General: He is not in acute distress.     Appearance: Normal appearance. He is well-developed.   HENT:      Head: Normocephalic and atraumatic.   Eyes:      Conjunctiva/sclera: Conjunctivae normal.   Cardiovascular:       Rate and Rhythm: Normal rate and regular rhythm.      Pulses: Normal pulses.      Heart sounds: Normal heart sounds. No murmur heard.  Pulmonary:      Effort: Pulmonary effort is normal. No respiratory distress.      Breath sounds: Normal breath sounds.   Abdominal:      General: Bowel sounds are normal. There is no distension.      Palpations: Abdomen is soft.      Tenderness: There is no abdominal tenderness.   Musculoskeletal:         General: Normal range of motion.      Cervical back: Normal range of motion and neck supple.   Skin:     General: Skin is warm and dry.      Findings: No rash.   Neurological:      General: No focal deficit present.      Mental Status: He is alert and oriented to person, place, and time.   Psychiatric:         Mood and Affect: Mood normal.         Behavior: Behavior normal.         All labs, imaging and procedures performed since last visit have been personally reviewed.    Answers submitted by the patient for this visit:  Review of Systems Questionnaire (Submitted on 9/7/2023)  activity change: No  unexpected weight change: No  rhinorrhea: No  trouble swallowing: No  visual disturbance: No  chest tightness: No  polyuria: No  difficulty urinating: No  joint swelling: No  arthralgias: No  confusion: No  dysphoric mood: No

## 2023-09-07 NOTE — PATIENT INSTRUCTIONS
-let me know when you are ready for me to put in orders for colonoscopy and if you would prefer [facility] vs [facility]

## 2023-09-12 ENCOUNTER — PATIENT MESSAGE (OUTPATIENT)
Dept: FAMILY MEDICINE | Facility: CLINIC | Age: 48
End: 2023-09-12
Payer: COMMERCIAL

## 2023-09-12 ENCOUNTER — LAB VISIT (OUTPATIENT)
Dept: LAB | Facility: HOSPITAL | Age: 48
End: 2023-09-12
Attending: INTERNAL MEDICINE
Payer: COMMERCIAL

## 2023-09-12 DIAGNOSIS — Z00.00 ENCOUNTER FOR ANNUAL HEALTH EXAMINATION: ICD-10-CM

## 2023-09-12 DIAGNOSIS — Z13.1 DIABETES MELLITUS SCREENING: ICD-10-CM

## 2023-09-12 DIAGNOSIS — Z13.6 ENCOUNTER FOR LIPID SCREENING FOR CARDIOVASCULAR DISEASE: ICD-10-CM

## 2023-09-12 DIAGNOSIS — Z13.220 ENCOUNTER FOR LIPID SCREENING FOR CARDIOVASCULAR DISEASE: ICD-10-CM

## 2023-09-12 LAB
ALBUMIN SERPL BCP-MCNC: 3.8 G/DL (ref 3.5–5.2)
ALP SERPL-CCNC: 52 U/L (ref 55–135)
ALT SERPL W/O P-5'-P-CCNC: 25 U/L (ref 10–44)
ANION GAP SERPL CALC-SCNC: 13 MMOL/L (ref 8–16)
AST SERPL-CCNC: 17 U/L (ref 10–40)
BILIRUB SERPL-MCNC: 0.7 MG/DL (ref 0.1–1)
BUN SERPL-MCNC: 12 MG/DL (ref 6–20)
CALCIUM SERPL-MCNC: 9.4 MG/DL (ref 8.7–10.5)
CHLORIDE SERPL-SCNC: 104 MMOL/L (ref 95–110)
CHOLEST SERPL-MCNC: 174 MG/DL (ref 120–199)
CHOLEST/HDLC SERPL: 3.2 {RATIO} (ref 2–5)
CO2 SERPL-SCNC: 21 MMOL/L (ref 23–29)
CREAT SERPL-MCNC: 0.8 MG/DL (ref 0.5–1.4)
ERYTHROCYTE [DISTWIDTH] IN BLOOD BY AUTOMATED COUNT: 12.4 % (ref 11.5–14.5)
EST. GFR  (NO RACE VARIABLE): >60 ML/MIN/1.73 M^2
ESTIMATED AVG GLUCOSE: 91 MG/DL (ref 68–131)
GLUCOSE SERPL-MCNC: 92 MG/DL (ref 70–110)
HBA1C MFR BLD: 4.8 % (ref 4–5.6)
HCT VFR BLD AUTO: 43.6 % (ref 40–54)
HDLC SERPL-MCNC: 55 MG/DL (ref 40–75)
HDLC SERPL: 31.6 % (ref 20–50)
HGB BLD-MCNC: 14.9 G/DL (ref 14–18)
LDLC SERPL CALC-MCNC: 108.8 MG/DL (ref 63–159)
MCH RBC QN AUTO: 29.3 PG (ref 27–31)
MCHC RBC AUTO-ENTMCNC: 34.2 G/DL (ref 32–36)
MCV RBC AUTO: 86 FL (ref 82–98)
NONHDLC SERPL-MCNC: 119 MG/DL
PLATELET # BLD AUTO: 214 K/UL (ref 150–450)
PMV BLD AUTO: 10.1 FL (ref 9.2–12.9)
POTASSIUM SERPL-SCNC: 4.1 MMOL/L (ref 3.5–5.1)
PROT SERPL-MCNC: 6.4 G/DL (ref 6–8.4)
RBC # BLD AUTO: 5.08 M/UL (ref 4.6–6.2)
SODIUM SERPL-SCNC: 138 MMOL/L (ref 136–145)
TRIGL SERPL-MCNC: 51 MG/DL (ref 30–150)
WBC # BLD AUTO: 5.76 K/UL (ref 3.9–12.7)

## 2023-09-12 PROCEDURE — 85027 COMPLETE CBC AUTOMATED: CPT | Performed by: INTERNAL MEDICINE

## 2023-09-12 PROCEDURE — 83036 HEMOGLOBIN GLYCOSYLATED A1C: CPT | Performed by: INTERNAL MEDICINE

## 2023-09-12 PROCEDURE — 80061 LIPID PANEL: CPT | Performed by: INTERNAL MEDICINE

## 2023-09-12 PROCEDURE — 80053 COMPREHEN METABOLIC PANEL: CPT | Performed by: INTERNAL MEDICINE

## 2023-09-12 PROCEDURE — 36415 COLL VENOUS BLD VENIPUNCTURE: CPT | Mod: PO | Performed by: INTERNAL MEDICINE

## 2023-09-13 ENCOUNTER — PATIENT MESSAGE (OUTPATIENT)
Dept: FAMILY MEDICINE | Facility: CLINIC | Age: 48
End: 2023-09-13
Payer: COMMERCIAL

## 2023-09-18 ENCOUNTER — PATIENT MESSAGE (OUTPATIENT)
Dept: FAMILY MEDICINE | Facility: CLINIC | Age: 48
End: 2023-09-18
Payer: COMMERCIAL

## 2023-09-18 NOTE — PROGRESS NOTES
"Results have been released via [employer]. Please verify that these have been viewed by patient. If not, please call patient with results.    I have reviewed your recent results.    A1C NORMAL-The A1c is normal. No evidence of diabetes or prediabetes.     CBC NORMAL-The CBC appears to be stable at this time with no sign of major anemia, abnormal white count or platelet abnormality.    CMP/BMP NORMAL-The electrolytes all appear stable at this time.  This includes kidney functions along with routine electrolytes like sugar, potassium and sodium.  The liver enzymes were noted to be stable also. The CO2 (or really bicarbonate) was only just slightly below limits and not significant enough to be concerned about. Sometimes this can happen with dehydration or certain medications. Very low levels can indicated an acidosis but your levels are not low enough to be concerned for that. The alk phos is a liver enzyme that I see fall slightly below "normal" limits all of the time in patient but with no concern. Your level is at its baseline/average that it has been at for the past 4 years.    LIPID NORMAL SCREEN-The cholesterol panel screening showed levels that are considered at target at this time.  Recheck each year.     Please let me know if you have any additional questions or concerns about above."

## 2023-09-24 DIAGNOSIS — I10 ESSENTIAL HYPERTENSION: ICD-10-CM

## 2023-09-24 NOTE — TELEPHONE ENCOUNTER
No care due was identified.  [facility] Embedded Care Due Messages. Reference number: [id number].   9/24/2023 9:36:21 AM CDT

## 2023-09-25 RX ORDER — AMLODIPINE BESYLATE 10 MG/1
10 TABLET ORAL
Qty: 90 TABLET | Refills: 3 | Status: SHIPPED | OUTPATIENT
Start: 2023-09-25 | End: 2023-12-09

## 2023-09-25 NOTE — TELEPHONE ENCOUNTER
Refill Decision Note   Luke Del Rosario  is requesting a refill authorization.  Brief Assessment and Rationale for Refill:  Approve     Medication Therapy Plan:         Comments:     Note composed:7:46 AM 09/25/2023

## 2023-10-18 ENCOUNTER — PATIENT MESSAGE (OUTPATIENT)
Dept: FAMILY MEDICINE | Facility: CLINIC | Age: 48
End: 2023-10-18
Payer: COMMERCIAL

## 2023-10-18 DIAGNOSIS — Z12.11 SCREENING FOR COLON CANCER: Primary | ICD-10-CM

## 2023-10-20 NOTE — TELEPHONE ENCOUNTER
I have signed for the following orders AND/OR meds.  Please call the patient and ask the patient to schedule the testing AND/OR inform about any medications that were sent. Medications have been sent to pharmacy listed below      Orders Placed This Encounter   Procedures    Ambulatory referral/consult to Endo Procedure      Standing Status:   Future     Standing Expiration Date:   4/30/2024     Referral Priority:   Routine     Referral Type:   Consultation     Number of Visits Requested:   1              [facility] - [place] - [id number] [facility]  [place] [address]  Phone: [phone] Fax: [phone]

## 2023-10-25 ENCOUNTER — HOSPITAL ENCOUNTER (OUTPATIENT)
Dept: PREADMISSION TESTING | Facility: HOSPITAL | Age: 48
Discharge: HOME | End: 2023-10-25
Attending: INTERNAL MEDICINE
Payer: COMMERCIAL

## 2023-10-25 DIAGNOSIS — Z12.11 SCREENING FOR COLON CANCER: Primary | ICD-10-CM

## 2023-10-25 RX ORDER — SODIUM, POTASSIUM,MAG SULFATES 17.5-3.13G
1 SOLUTION, RECONSTITUTED, ORAL ORAL DAILY
Qty: 1 KIT | Refills: 0 | Status: SHIPPED | OUTPATIENT
Start: 2023-10-25 | End: 2023-10-27

## 2023-11-06 ENCOUNTER — PATIENT MESSAGE (OUTPATIENT)
Dept: ENDOSCOPY | Facility: HOSPITAL | Age: 48
End: 2023-11-06
Payer: COMMERCIAL

## 2023-11-06 ENCOUNTER — PATIENT MESSAGE (OUTPATIENT)
Dept: FAMILY MEDICINE | Facility: CLINIC | Age: 48
End: 2023-11-06
Payer: COMMERCIAL

## 2023-11-14 ENCOUNTER — TELEPHONE (OUTPATIENT)
Dept: PREADMISSION TESTING | Facility: HOSPITAL | Age: 48
End: 2023-11-14
Payer: COMMERCIAL

## 2023-11-14 ENCOUNTER — PATIENT MESSAGE (OUTPATIENT)
Dept: FAMILY MEDICINE | Facility: CLINIC | Age: 48
End: 2023-11-14
Payer: COMMERCIAL

## 2023-11-14 NOTE — TELEPHONE ENCOUNTER
----- Message from Marko Henderson sent at 11/13/2023  4:12 PM CST -----  Regarding: reschedule  Contact: patient  Patient procedure is scheduled for 11/21/2023, he need to reschedule for another day, please call patient at [phone]    Thanks

## 2023-12-08 DIAGNOSIS — I10 ESSENTIAL HYPERTENSION: ICD-10-CM

## 2023-12-08 NOTE — TELEPHONE ENCOUNTER
No care due was identified.  [facility] Embedded Care Due Messages. Reference number: [id number].   12/08/2023 5:12:06 AM CST

## 2023-12-09 ENCOUNTER — PATIENT MESSAGE (OUTPATIENT)
Dept: FAMILY MEDICINE | Facility: CLINIC | Age: 48
End: 2023-12-09
Payer: COMMERCIAL

## 2023-12-09 RX ORDER — AMLODIPINE BESYLATE 10 MG/1
10 TABLET ORAL
Qty: 90 TABLET | Refills: 2 | Status: SHIPPED | OUTPATIENT
Start: 2023-12-09

## 2023-12-09 NOTE — TELEPHONE ENCOUNTER
Refill Decision Note   Luke Del Rosario  is requesting a refill authorization.  Brief Assessment and Rationale for Refill:  Approve     Medication Therapy Plan:         Comments:     Note composed:4:15 PM 12/09/2023

## 2024-02-20 ENCOUNTER — PATIENT MESSAGE (OUTPATIENT)
Dept: FAMILY MEDICINE | Facility: CLINIC | Age: 49
End: 2024-02-20
Payer: COMMERCIAL

## 2024-02-20 DIAGNOSIS — E66.01 SEVERE OBESITY (BMI 35.0-35.9 WITH COMORBIDITY): Primary | ICD-10-CM

## 2024-02-23 NOTE — TELEPHONE ENCOUNTER
I placed a referral to bariatrics. They will be able to give him all of the info that he is asking about.    I have signed for the following orders AND/OR meds.  Please call the patient and ask the patient to schedule the testing AND/OR inform about any medications that were sent. Medications have been sent to pharmacy listed below      Orders Placed This Encounter   Procedures    Ambulatory referral/consult to Bariatric/Obesity Medicine     Standing Status:   Future     Standing Expiration Date:   3/23/2025     Referral Priority:   Routine     Referral Type:   Consultation     Referral Reason:   Specialty Services Required     Requested Specialty:   Bariatrics     Number of Visits Requested:   1              [facility] - [place] - [id number] [facility]  [place] [address]  Phone: [phone] Fax: [phone]    [id number] - [place] - [address] AT [facility] [address] [facility]  [address]  [place] [address]  Phone: [phone] Fax: [phone]

## 2024-03-19 ENCOUNTER — PATIENT MESSAGE (OUTPATIENT)
Dept: FAMILY MEDICINE | Facility: CLINIC | Age: 49
End: 2024-03-19
Payer: COMMERCIAL

## 2024-03-20 ENCOUNTER — OFFICE VISIT (OUTPATIENT)
Dept: FAMILY MEDICINE | Facility: CLINIC | Age: 49
End: 2024-03-20
Payer: COMMERCIAL

## 2024-03-20 ENCOUNTER — PATIENT MESSAGE (OUTPATIENT)
Dept: FAMILY MEDICINE | Facility: CLINIC | Age: 49
End: 2024-03-20

## 2024-03-20 DIAGNOSIS — E66.9 OBESITY (BMI 30-39.9): Primary | ICD-10-CM

## 2024-03-20 PROCEDURE — 99213 OFFICE O/P EST LOW 20 MIN: CPT | Mod: 95,,, | Performed by: PHYSICIAN ASSISTANT

## 2024-03-20 RX ORDER — TIRZEPATIDE 2.5 MG/.5ML
2.5 INJECTION, SOLUTION SUBCUTANEOUS
Qty: 4 PEN | Refills: 5 | Status: SHIPPED | OUTPATIENT
Start: 2024-03-20 | End: 2024-03-25 | Stop reason: ALTCHOICE

## 2024-03-20 NOTE — PROGRESS NOTES
Primary Care Telemedicine Note  The patient location is:  Patient Home   The chief complaint leading to consultation is: Obesity  Total time spent with patient: 15 minutes    Visit type: Virtual visit with synchronous audio only and video  Each patient to whom he or she provides medical services by telemedicine is: (1) informed of the relationship between the physician and patient and the respective role of any other health care provider with respect to management of the patient; and (2) notified that he or she may decline to receive medical services by telemedicine and may withdraw from such care at any time.      Assessment/Plan:    Problem List Items Addressed This Visit          Endocrine    Obesity (BMI 30-39.9) - Primary    Overview     General weight loss/lifestyle modification strategies discussed (elicit support from others; identify saboteurs; non-food rewards, etc).  Informal exercise measures discussed, e.g. taking stairs instead of elevator.  Regular aerobic exercise program discussed.  Medication: Previously on Adipex with AE of elevated BP  Attempting to start GLP1           Current Assessment & Plan     Discussed risks and benefits of therapy.  Reviewed patient's family history and personal history for thyroid C cell type tumor and MEN syndrome. Patient denies a history of these disorders or syndromes. Patient was aware of increased risk of pancreatitis and worsening of these conditions in animal studies. Also discussed side effects of nausea, vomiting, diarrhea, and abdominal pain. Patient should notify me immediately if having any of these symptoms. ER precautions were given.             Relevant Medications    tirzepatide, weight loss, (ZEPBOUND) 2.5 mg/0.5 mL PnIj       Follow up in about 3 months (around 6/20/2024), or if symptoms worsen or fail to improve.    Lin Hinson,  CYNDY  _____________________________________________________________________________________________________________________________________________________    CC: obesity    HPI: Patient is an established patient who presents today via virtual visit for obesity and to discuss weight loss medication. Denies bulimia, cold intolerance, edema, hip pain, hirsutism, knee pain, polydipsia, polyuria, thirst and weakness. Previous treatments for obesity :prescription appetite suppressants: Adipex and self-directed dieting without success. He reports adverse effects of elevated BP with Adipex. He has also been dieting and exercising with minimal change in weight. He is requesting to try a different medication to help with weight loss. No other complaints today.    Past Medical History:   Diagnosis Date    HLD (hyperlipidemia)     HTN (hypertension) 2/7/2012    Migraines     Panic disorder      Past Surgical History:   Procedure Laterality Date    ARTHROSCOPY OF KNEE Right      Review of patient's allergies indicates:   Allergen Reactions    No known drug allergies      Social History     Tobacco Use    Smoking status: Never    Smokeless tobacco: Never   Substance Use Topics    Alcohol use: Yes     Alcohol/week: 6.0 standard drinks of alcohol     Types: 6 Cans of beer per week    Drug use: No     Family History   Problem Relation Age of Onset    Diabetes Father     Diabetes Paternal Aunt     Diabetes Paternal Uncle      Current Outpatient Medications on File Prior to Visit   Medication Sig Dispense Refill    ADBRY 150 mg/mL Syrg       amLODIPine (NORVASC) 10 MG tablet TAKE 1 TABLET(10 MG) BY MOUTH EVERY DAY 90 tablet 2    CINNAMON BARK (CINNAMON ORAL) Take by mouth.      clobetasoL (TEMOVATE) 0.05 % cream Apply topically 2 (two) times daily. Apply to affected areas of skin on body/scalp until improved, then can stop and restart as needed. Avoid face, under arms, and genitals. 60 g 1    lisinopriL (PRINIVIL,ZESTRIL) 20 MG  tablet TAKE 1 TABLET(20 MG) BY MOUTH EVERY DAY 90 tablet 3    magnesium 30 mg Tab Take by mouth.      meloxicam (MOBIC) 15 MG tablet TAKE 1 TABLET(15 MG) BY MOUTH DAILY      methylPREDNISolone (MEDROL DOSEPACK) 4 mg tablet follow package directions 21 tablet 0    multivitamin (THERAGRAN) per tablet Take 1 tablet by mouth.      naproxen (NAPROSYN) 500 MG tablet Take 1 tablet (500 mg total) by mouth 2 (two) times daily as needed (pain). 30 tablet 0    triamcinolone acetonide 0.1% (KENALOG) 0.1 % ointment Apply to AA on body BID x 2 weeks, PRN flares 80 g 2     No current facility-administered medications on file prior to visit.       Review of Systems   Constitutional:  Negative for chills, fever and malaise/fatigue.   HENT:  Negative for hearing loss.    Eyes:  Negative for discharge.   Respiratory:  Negative for cough, shortness of breath and wheezing.    Cardiovascular:  Negative for chest pain, palpitations and leg swelling.   Gastrointestinal:  Negative for abdominal pain, blood in stool, constipation, diarrhea and vomiting.   Genitourinary:  Negative for dysuria, frequency, hematuria and urgency.   Musculoskeletal:  Negative for back pain, joint pain and neck pain.   Neurological:  Negative for weakness and headaches.   Endo/Heme/Allergies:  Negative for polydipsia.   Psychiatric/Behavioral:  Negative for depression. The patient is not nervous/anxious.          Physical Exam  Constitutional:       General: He is not in acute distress.     Appearance: He is well-developed.   HENT:      Head: Normocephalic and atraumatic.   Pulmonary:      Effort: Pulmonary effort is normal. No respiratory distress.   Abdominal:      General: There is no distension.   Musculoskeletal:         General: Normal range of motion.      Cervical back: Normal range of motion.   Neurological:      Mental Status: He is alert and oriented to person, place, and time.   Psychiatric:         Mood and Affect: Mood normal.         Behavior:  Behavior normal.         The patient's Health Maintenance was reviewed and the following appears to be due at this time:  Health Maintenance Due   Topic Date Due    HIV Screening  Never done    Colorectal Cancer Screening  Never done    Influenza Vaccine (1) 09/01/2023    COVID-19 Vaccine (5 - 2023-24 season) 09/01/2023

## 2024-03-20 NOTE — ASSESSMENT & PLAN NOTE
Discussed risks and benefits of therapy.  Reviewed patient's family history and personal history for thyroid C cell type tumor and MEN syndrome. Patient denies a history of these disorders or syndromes. Patient was aware of increased risk of pancreatitis and worsening of these conditions in animal studies. Also discussed side effects of nausea, vomiting, diarrhea, and abdominal pain. Patient should notify me immediately if having any of these symptoms. ER precautions were given.

## 2024-03-21 ENCOUNTER — TELEPHONE (OUTPATIENT)
Dept: FAMILY MEDICINE | Facility: CLINIC | Age: 49
End: 2024-03-21
Payer: COMMERCIAL

## 2024-03-21 DIAGNOSIS — E66.01 SEVERE OBESITY (BMI 35.0-35.9 WITH COMORBIDITY): Primary | ICD-10-CM

## 2024-03-21 NOTE — TELEPHONE ENCOUNTER
03/21/2024 3:08 PM   PA for Zepbound denied. Fax sent over stating a benefit plan alternative is Saxenda Inj 18mg/3ml.

## 2024-03-25 NOTE — TELEPHONE ENCOUNTER
Please make patient aware this is an alternative to Zepbound, however, it is a once a day injection (vs. once a week). Please make sure patient follows instructions as stated on Rx.    I have signed for the following orders AND/OR meds.  Please call the patient and ask the patient to schedule the testing AND/OR inform about any medications that were sent. Medications have been sent to pharmacy listed below      No orders of the defined types were placed in this encounter.      Medications Ordered This Encounter   Medications    liraglutide, weight loss, (SAXENDA) 3 mg/0.5 mL (18 mg/3 mL) PnIj     Sig: Week 1: 0.6 mg once daily, Week 2: 1.2 mg once daily, Week 3: 1.8 mg once daily, Week 4: 2.4 mg once daily, Week 5: 3 mg once daily (maintenance dose)     Dispense:  3 mL     Refill:  5         [facility] - [place]  [id number] [facility]  [place] [address]  Phone: [phone] Fax: [phone]    [id number] - [place] - [address] AT [facility] [address] [facility]  [address]  [place] [address]  Phone: [phone] Fax: [phone]

## 2024-04-01 ENCOUNTER — TELEPHONE (OUTPATIENT)
Dept: FAMILY MEDICINE | Facility: CLINIC | Age: 49
End: 2024-04-01
Payer: COMMERCIAL

## 2024-04-01 NOTE — TELEPHONE ENCOUNTER
----- Message from Ashia Miranda sent at 4/1/2024 10:37 AM CDT -----  Contact: pt  Type:  Patient Returning Call    Who Called:pt   Who Left Message for Patient:Office   Does the patient know what this is regarding?:no   Would the patient rather a call back or a response via [facility]? Call   Best Call Back Number: [phone]  Additional Information:

## 2024-04-01 NOTE — TELEPHONE ENCOUNTER
----- Message from Barbie Adair sent at 4/1/2024 11:30 AM CDT -----  Contact: pt  Type:  Patient Returning Call    Who Called: pt  Who Left Message for Patient: Jesus  Does the patient know what this is regarding?:  madihacaton  Would the patient rather a call back or a response via [facility]? phone  Best Call Back Number: [phone]  Additional Information:

## 2024-06-15 DIAGNOSIS — I10 ESSENTIAL HYPERTENSION: ICD-10-CM

## 2024-06-15 NOTE — TELEPHONE ENCOUNTER
No care due was identified.  [facility] Embedded Care Due Messages. Reference number: [id number].   6/15/2024 6:04:33 AM CDT

## 2024-06-17 RX ORDER — LISINOPRIL 20 MG/1
20 TABLET ORAL DAILY
Qty: 90 TABLET | Refills: 1 | Status: SHIPPED | OUTPATIENT
Start: 2024-06-17

## 2024-06-17 NOTE — TELEPHONE ENCOUNTER
Refill Decision Note   Luke Del Rosario  is requesting a refill authorization.  Brief Assessment and Rationale for Refill:  Approve     Medication Therapy Plan:         Comments:     Note composed:7:50 AM 06/17/2024

## 2024-07-11 ENCOUNTER — PATIENT MESSAGE (OUTPATIENT)
Dept: FAMILY MEDICINE | Facility: CLINIC | Age: 49
End: 2024-07-11
Payer: COMMERCIAL

## 2024-07-12 ENCOUNTER — OFFICE VISIT (OUTPATIENT)
Dept: FAMILY MEDICINE | Facility: CLINIC | Age: 49
End: 2024-07-12
Payer: COMMERCIAL

## 2024-07-12 ENCOUNTER — PATIENT MESSAGE (OUTPATIENT)
Dept: FAMILY MEDICINE | Facility: CLINIC | Age: 49
End: 2024-07-12

## 2024-07-12 DIAGNOSIS — E66.01 SEVERE OBESITY (BMI 35.0-35.9 WITH COMORBIDITY): Primary | ICD-10-CM

## 2024-07-12 DIAGNOSIS — E66.01 SEVERE OBESITY (BMI 35.0-35.9 WITH COMORBIDITY): ICD-10-CM

## 2024-07-12 DIAGNOSIS — L30.9 ECZEMA, UNSPECIFIED TYPE: ICD-10-CM

## 2024-07-12 DIAGNOSIS — M17.0 PRIMARY OSTEOARTHRITIS OF BOTH KNEES: Primary | ICD-10-CM

## 2024-07-12 RX ORDER — SEMAGLUTIDE 0.25 MG/.5ML
0.25 INJECTION, SOLUTION SUBCUTANEOUS
Qty: 3 ML | Refills: 5 | Status: SHIPPED | OUTPATIENT
Start: 2024-07-12 | End: 2024-07-12 | Stop reason: ALTCHOICE

## 2024-07-12 RX ORDER — MELOXICAM 15 MG/1
15 TABLET ORAL DAILY
Qty: 90 TABLET | Refills: 1 | Status: SHIPPED | OUTPATIENT
Start: 2024-07-12 | End: 2025-01-08

## 2024-07-12 NOTE — PROGRESS NOTES
Primary Care Telemedicine Note  The patient location is: Patient Home   The chief complaint leading to consultation is: Medication refill  Total time spent with patient: 15 minutes    Visit type: Virtual visit with synchronous audio only and video  Each patient to whom he or she provides medical services by telemedicine is: (1) informed of the relationship between the physician and patient and the respective role of any other health care provider with respect to management of the patient; and (2) notified that he or she may decline to receive medical services by telemedicine and may withdraw from such care at any time.      Assessment/Plan:    Problem List Items Addressed This Visit          Derm    Eczema    Overview     -remains on Dupixent  -followed by derm            Endocrine    Severe obesity (BMI 35.0-35.9 with comorbidity)    Overview     General weight loss/lifestyle modification strategies discussed (elicit support from others; identify saboteurs; non-food rewards, etc).  Informal exercise measures discussed, e.g. taking stairs instead of elevator.  Regular aerobic exercise program discussed.  Medication: Previously on Adipex with AE of elevated BP  Attempting to start GLP1  Discussed trying Topamax vs. Contrave in the future if needed         Relevant Medications    semaglutide, weight loss, (WEGOVY) 0.25 mg/0.5 mL PnIj       Orthopedic    Primary osteoarthritis of both knees - Primary    Overview     -chronic, stable  -remains on daily Mobic  -followed by ortho          Relevant Medications    meloxicam (MOBIC) 15 MG tablet       Follow up in about 3 months (around 10/12/2024), or if symptoms worsen or fail to improve, for annual and labs.    Lin Hinson PA-C  _____________________________________________________________________________________________________________________________________________________    CC: medication refill    HPI: Patient is an established patient who presents today via  virtual visit for medication refill. Patient has chronic bilateral knee pain in which he remains on daily Meloxicam. He is followed by ortho for this. He did stop the medication for a short period of time due to ongoing skin issues/eczema, however, eczema has improved with Dupixent so he is requesting to restart Meloxicam. Denies previous AE. He reports improvement in symptoms with the medication. No other complaints today.     Past Medical History:   Diagnosis Date    HLD (hyperlipidemia)     HTN (hypertension) 2/7/2012    Migraines     Panic disorder      Past Surgical History:   Procedure Laterality Date    ARTHROSCOPY OF KNEE Right      Review of patient's allergies indicates:   Allergen Reactions    No known drug allergies      Social History     Tobacco Use    Smoking status: Never     Passive exposure: Never    Smokeless tobacco: Never   Substance Use Topics    Alcohol use: Yes     Alcohol/week: 6.0 standard drinks of alcohol     Types: 6 Cans of beer per week    Drug use: No     Family History   Problem Relation Name Age of Onset    Diabetes Father Judd Del Rosario     Diabetes Paternal Aunt Naomie Del Rosario     Diabetes Paternal Uncle Khanh Del Rosario      Current Outpatient Medications on File Prior to Visit   Medication Sig Dispense Refill    amLODIPine (NORVASC) 10 MG tablet TAKE 1 TABLET(10 MG) BY MOUTH EVERY DAY 90 tablet 2    CINNAMON BARK (CINNAMON ORAL) Take by mouth.      dupilumab (DUPIXENT PEN SUBQ) Inject into the skin.      glucosamine-chondroitin 500-400 mg tablet Take 1 tablet by mouth 3 (three) times daily.      lisinopriL (PRINIVIL,ZESTRIL) 20 MG tablet Take 1 tablet (20 mg total) by mouth once daily. 90 tablet 1    magnesium 30 mg Tab Take by mouth.      multivitamin (THERAGRAN) per tablet Take 1 tablet by mouth.      [DISCONTINUED] ADBRY 150 mg/mL Syrg       [DISCONTINUED] clobetasoL (TEMOVATE) 0.05 % cream Apply topically 2 (two) times daily. Apply to affected areas of skin on body/scalp until  improved, then can stop and restart as needed. Avoid face, under arms, and genitals. (Patient not taking: Reported on 4/1/2024) 60 g 1    [DISCONTINUED] liraglutide, weight loss, (SAXENDA) 3 mg/0.5 mL (18 mg/3 mL) PnIj Week 1: 0.6 mg once daily, Week 2: 1.2 mg once daily, Week 3: 1.8 mg once daily, Week 4: 2.4 mg once daily, Week 5: 3 mg once daily (maintenance dose) 3 mL 5    [DISCONTINUED] methylPREDNISolone (MEDROL DOSEPACK) 4 mg tablet follow package directions (Patient not taking: Reported on 4/1/2024) 21 tablet 0    [DISCONTINUED] naproxen (NAPROSYN) 500 MG tablet Take 1 tablet (500 mg total) by mouth 2 (two) times daily as needed (pain). (Patient not taking: Reported on 4/1/2024) 30 tablet 0    [DISCONTINUED] triamcinolone acetonide 0.1% (KENALOG) 0.1 % ointment Apply to AA on body BID x 2 weeks, PRN flares 80 g 2     No current facility-administered medications on file prior to visit.       Review of Systems   Constitutional:  Negative for chills, fever and malaise/fatigue.   HENT:  Negative for hearing loss.    Eyes:  Negative for discharge.   Respiratory:  Negative for cough, shortness of breath and wheezing.    Cardiovascular:  Negative for chest pain, palpitations and leg swelling.   Gastrointestinal:  Negative for abdominal pain, blood in stool, constipation, diarrhea and vomiting.   Genitourinary:  Negative for dysuria, frequency, hematuria and urgency.   Musculoskeletal:  Negative for back pain, joint pain and neck pain.   Neurological:  Negative for weakness and headaches.   Endo/Heme/Allergies:  Negative for polydipsia.   Psychiatric/Behavioral:  Negative for depression. The patient is not nervous/anxious.        Physical Exam  Constitutional:       General: He is not in acute distress.     Appearance: He is well-developed.   HENT:      Head: Normocephalic and atraumatic.   Pulmonary:      Effort: Pulmonary effort is normal. No respiratory distress.   Abdominal:      General: There is no distension.    Musculoskeletal:         General: Normal range of motion.      Cervical back: Normal range of motion.   Neurological:      Mental Status: He is alert and oriented to person, place, and time.   Psychiatric:         Mood and Affect: Mood normal.         Behavior: Behavior normal.         The patient's Health Maintenance was reviewed and the following appears to be due at this time:  Health Maintenance Due   Topic Date Due    HIV Screening  Never done    Colorectal Cancer Screening  Never done    COVID-19 Vaccine (5 - 2023-24 season) 09/01/2023

## 2024-07-12 NOTE — TELEPHONE ENCOUNTER
I have signed for the following orders AND/OR meds.  Please call the patient and ask the patient to schedule the testing AND/OR inform about any medications that were sent. Medications have been sent to pharmacy listed below      No orders of the defined types were placed in this encounter.      Medications Ordered This Encounter   Medications    naltrexone-bupropion (CONTRAVE) 8-90 mg TbSR     Sig: Take 1 tablet by mouth 2 (two) times daily.     Dispense:  60 tablet     Refill:  0         [facility] - [place]  [address]  [place] [address]  Phone: [phone] Fax: [phone]    [id number] - [place] - [address] AT [facility] [address] [facility]  [address] [facility]  [place] [address]  Phone: [phone] Fax: [phone]

## 2024-07-26 ENCOUNTER — PATIENT MESSAGE (OUTPATIENT)
Dept: FAMILY MEDICINE | Facility: CLINIC | Age: 49
End: 2024-07-26
Payer: COMMERCIAL

## 2024-07-26 DIAGNOSIS — E66.09 CLASS 1 OBESITY DUE TO EXCESS CALORIES WITH SERIOUS COMORBIDITY AND BODY MASS INDEX (BMI) OF 33.0 TO 33.9 IN ADULT: Primary | ICD-10-CM

## 2024-07-26 NOTE — TELEPHONE ENCOUNTER
I have signed for the following orders AND/OR meds.  Please call the patient and ask the patient to schedule the testing AND/OR inform about any medications that were sent. Medications have been sent to pharmacy listed below      No orders of the defined types were placed in this encounter.      Medications Ordered This Encounter   Medications    semaglutide, weight loss, 0.25 mg/0.5 mL PnIj     Sig: Inject 0.25 mg into the skin every 7 days.     Dispense:  6 mL     Refill:  1         [facility] - [place]  [id number] [facility]  [place] [address]  Phone: [phone] Fax: [phone]    [facility] #[id number] - [place] - [address] [facility] [address] [facility]  [address] [facility]  [place] [address]  Phone: [phone] Fax: [phone]

## 2024-08-13 ENCOUNTER — PATIENT MESSAGE (OUTPATIENT)
Dept: FAMILY MEDICINE | Facility: CLINIC | Age: 49
End: 2024-08-13
Payer: COMMERCIAL

## 2024-08-29 DIAGNOSIS — I10 ESSENTIAL HYPERTENSION: ICD-10-CM

## 2024-08-29 RX ORDER — AMLODIPINE BESYLATE 10 MG/1
10 TABLET ORAL
Qty: 90 TABLET | Refills: 0 | Status: SHIPPED | OUTPATIENT
Start: 2024-08-29

## 2024-08-29 RX ORDER — AMLODIPINE BESYLATE 10 MG/1
10 TABLET ORAL
Qty: 90 TABLET | Refills: 2 | OUTPATIENT
Start: 2024-08-29

## 2024-08-29 NOTE — TELEPHONE ENCOUNTER
No care due was identified.  [facility] Embedded Care Due Messages. Reference number: [id number].   8/29/2024 11:12:19 AM CDT

## 2024-08-29 NOTE — TELEPHONE ENCOUNTER
Care Due:                  Date            Visit Type   Department     Provider  --------------------------------------------------------------------------------                                MYCHART                              FOLLOWUP/OF  [facility] FAMILY  Last Visit: 09-      FICE VISIT   MEDICINE       Betsey Mejia  Next Visit: None Scheduled  None         None Found                                                            Last  Test          Frequency    Reason                     Performed    Due Date  --------------------------------------------------------------------------------    CMP.........  12 months..  lisinopriL...............  09- 09-    [facility] Embedded Care Due Messages. Reference number: [id number].   8/29/2024 10:18:30 AM CDT

## 2024-08-29 NOTE — TELEPHONE ENCOUNTER
No care due was identified.  [facility] Embedded Care Due Messages. Reference number: [id number].   8/29/2024 10:18:49 AM CDT

## 2024-08-30 NOTE — TELEPHONE ENCOUNTER
Refill Decision Note   Luke Del Rosario  is requesting a refill authorization.  Brief Assessment and Rationale for Refill:  Quick Discontinue     Medication Therapy Plan:        Comments:     Note composed:8:51 PM 08/29/2024

## 2024-08-30 NOTE — TELEPHONE ENCOUNTER
Provider Staff:  Action required for this patient    Requires labs      Please see care gap opportunities below in Care Due Message.    Thanks!  [facility]     Appointments      Date Provider   Last Visit   9/7/2023 Betsey Mejia MD   Next Visit   8/29/2024 Betsey Mejia MD     Refill Decision Note   Luke Del Rosario  is requesting a refill authorization.  Brief Assessment and Rationale for Refill:  Quick Discontinue     Medication Therapy Plan:        Comments:     Note composed:8:34 PM 08/29/2024

## 2024-08-30 NOTE — TELEPHONE ENCOUNTER
Refill Decision Note   Luke Del Rosario  is requesting a refill authorization.  Brief Assessment and Rationale for Refill:  Approve     Medication Therapy Plan:         Comments:     Note composed:8:33 PM 08/29/2024

## 2024-09-26 ENCOUNTER — PATIENT MESSAGE (OUTPATIENT)
Dept: FAMILY MEDICINE | Facility: CLINIC | Age: 49
End: 2024-09-26
Payer: COMMERCIAL

## 2024-12-05 DIAGNOSIS — I10 ESSENTIAL HYPERTENSION: ICD-10-CM

## 2024-12-05 RX ORDER — LISINOPRIL 20 MG/1
TABLET ORAL
Qty: 90 TABLET | Refills: 3 | Status: SHIPPED | OUTPATIENT
Start: 2024-12-05

## 2024-12-05 RX ORDER — AMLODIPINE BESYLATE 10 MG/1
10 TABLET ORAL
Qty: 90 TABLET | Refills: 3 | Status: SHIPPED | OUTPATIENT
Start: 2024-12-05

## 2024-12-05 NOTE — TELEPHONE ENCOUNTER
Care Due:                  Date            Visit Type   Department     Provider  --------------------------------------------------------------------------------                                MYCHART                              FOLLOWUP/OF  [facility] FAMILY  Last Visit: 09-      FICE VISIT   MEDICINE       Betsey Mjeia  Next Visit: None Scheduled  None         None Found                                                            Last  Test          Frequency    Reason                     Performed    Due Date  --------------------------------------------------------------------------------    Office Visit  15 months..  amLODIPine, lisinopriL...  09- 11-    CMP.........  12 months..  lisinopriL...............  09- 09-    [employer] Embedded Care Due Messages. Reference number: [id number].   12/05/2024 5:52:12 AM CST

## 2024-12-05 NOTE — TELEPHONE ENCOUNTER
Refill Routing Note   Medication(s) are not appropriate for processing by [facility] for the following reason(s):      Patient not seen by provider within 15 months  Required labs outdated  Required vitals outdated    [facility] action(s):  Defer Care Due:  Appointment due  Labs due            Appointments  past 12m or future 3m with PCP    Date Provider   Last Visit   9/7/2023 Betsey Mejia MD   Next Visit   Visit date not found Betsey Mejia MD   ED visits in past 90 days: 0        Note composed:11:07 AM 12/05/2024

## 2025-01-28 ENCOUNTER — PATIENT MESSAGE (OUTPATIENT)
Dept: FAMILY MEDICINE | Facility: CLINIC | Age: 50
End: 2025-01-28
Payer: COMMERCIAL

## 2025-01-30 ENCOUNTER — PATIENT OUTREACH (OUTPATIENT)
Dept: ADMINISTRATIVE | Facility: HOSPITAL | Age: 50
End: 2025-01-30
Payer: COMMERCIAL

## 2025-02-12 PROBLEM — E66.09 CLASS 2 OBESITY DUE TO EXCESS CALORIES WITHOUT SERIOUS COMORBIDITY WITH BODY MASS INDEX (BMI) OF 35.0 TO 35.9 IN ADULT: Status: ACTIVE | Noted: 2025-02-12

## 2025-02-12 PROBLEM — E66.812 CLASS 2 OBESITY DUE TO EXCESS CALORIES WITHOUT SERIOUS COMORBIDITY WITH BODY MASS INDEX (BMI) OF 35.0 TO 35.9 IN ADULT: Status: ACTIVE | Noted: 2025-02-12

## 2025-02-14 ENCOUNTER — PATIENT MESSAGE (OUTPATIENT)
Dept: FAMILY MEDICINE | Facility: CLINIC | Age: 50
End: 2025-02-14
Payer: COMMERCIAL

## 2025-02-21 ENCOUNTER — E-VISIT (OUTPATIENT)
Dept: URGENT CARE | Facility: CLINIC | Age: 50
End: 2025-02-21
Payer: COMMERCIAL

## 2025-02-21 DIAGNOSIS — J11.1 INFLUENZA: Primary | ICD-10-CM

## 2025-02-21 RX ORDER — NAPROXEN 500 MG/1
500 TABLET ORAL 2 TIMES DAILY PRN
Qty: 10 TABLET | Refills: 0 | Status: SHIPPED | OUTPATIENT
Start: 2025-02-21 | End: 2025-02-26

## 2025-02-21 RX ORDER — OSELTAMIVIR PHOSPHATE 75 MG/1
75 CAPSULE ORAL 2 TIMES DAILY
Qty: 10 CAPSULE | Refills: 0 | Status: SHIPPED | OUTPATIENT
Start: 2025-02-21 | End: 2025-02-26

## 2025-02-21 RX ORDER — DESLORATADINE 5 MG/1
5 TABLET ORAL DAILY
Qty: 7 TABLET | Refills: 0 | Status: SHIPPED | OUTPATIENT
Start: 2025-02-21 | End: 2025-02-28

## 2025-02-21 NOTE — PROGRESS NOTES
Patient ID: Luke Del Rosario is a 49 y.o. male.    Chief Complaint: General Illness (Entered automatically based on patient selection in [facility].)             274}  The patient initiated a request through [employer] on 2/21/2025 for evaluation and management with a chief complaint of General Illness (Entered automatically based on patient selection in [employer].)     I evaluated the questionnaire submission on 02/21/2025 .    Total Time (in minutes): 11     [facility] Evisit [facility]group-Cough And Cold    2/21/2025 11:31 AM CST - Filed by Patient   What do you need help with? Flu   Do you agree to participate in an E-Visit? Yes   If you have any of the following symptoms, go to your local emergency room or call 911: I acknowledge   What is the main issue you would like addressed today? Contact w positive Flu type A   Do you think you might have COVID-19 or the Flu? Flu   Have you tested positive for COVID-19 or Flu? No   What symptoms do you currently have?  Headache;  Stuffy nose;  Sore throat   Have you had any trouble with your breathing, swollowing, or vision? None   Have you ever smoked? Never smoked   Have you had a fever? No   When did your symptoms first appear? 2/21/2025   In the last two weeks, have you been in close contact with someone who has COVID-19, the Flu, or strep throat? Yes   List what you have done or taken to help your symptoms. Rest   On a scale of 1-10, where 10 is the worst you can imagine, how severe are your symptoms? (range: 1 - 10) 6   Have your symptoms changed since they first started? Worsened   Do you have transportation to an [facility] location to get tested for COVID-19? Yes   Provide any additional information you feel is important. Daughter tested positive for Influenza A today. Her symptoms started yesterday.   Please attach any relevant images or files    Are you able to take your vital signs? No          Active Problem List with Overview Notes    Diagnosis Date Noted    Class 2  obesity due to excess calories without serious comorbidity with body mass index (BMI) of 35.0 to 35.9 in adult 02/12/2025     General weight loss/lifestyle modification strategies discussed (elicit support from others; identify saboteurs; non-food rewards, etc).  Informal exercise measures discussed, e.g. taking stairs instead of elevator.  Regular aerobic exercise program discussed.  Medication: Previously on Adipex with AE of elevated BP  Attempting to start GLP1  Discussed trying Topamax vs. Contrave in the future if needed      Primary osteoarthritis of both knees 07/12/2024     -chronic, stable  -remains on daily Mobic  -followed by ortho       Severe obesity (BMI 35.0-35.9 with comorbidity) 07/12/2024     General weight loss/lifestyle modification strategies discussed (elicit support from others; identify saboteurs; non-food rewards, etc).  Informal exercise measures discussed, e.g. taking stairs instead of elevator.  Regular aerobic exercise program discussed.  Medication: Previously on Adipex with AE of elevated BP  Attempting to start GLP1  Discussed trying Topamax vs. Contrave in the future if needed      Eczema 07/12/2024     -remains on Dupixent  -followed by derm      Rash 08/15/2022     -decadron IM today  -start topical steroids as prescribed   -avoid touching/scratching  -follow up if no improvement or worsening of symptoms        Non-seasonal allergic rhinitis due to pollen 05/04/2020    HTN (hypertension) 02/07/2012     Hypertension Medications               amLODIPine (NORVASC) 10 MG tablet Take 1 tablet (10 mg total) by mouth once daily.    lisinopriL (PRINIVIL,ZESTRIL) 20 MG tablet TAKE 1 TABLET(20 MG) BY MOUTH EVERY DAY        -at goal today  -continue lifestyle modification with low sodium diet and exercise   -discussed hypertension disease course and importance of treating high blood pressure  -patient understood and advised of risk of untreated blood pressure.  ER precautions were given    for symptoms of hypertensive urgency and emergency.      HLD (hyperlipidemia) 02/07/2012    Other headache syndrome 02/07/2012     Recent visit for headache. Has a hx of migraines in past but current headache not as severe. Sinus vs tension vs migraine. No alarm features. Suspected initially that likely sinus related given presentation. Slightly improved with antihistamine and intranasal steroid but still present. Just recently started kota pot. Would like to continue with current therapy but if no improvement will proceed with further imaging.      Panic disorder       Recent Labs Obtained:  Lab Results   Component Value Date    WBC 5.76 09/12/2023    HGB 14.9 09/12/2023    HCT 43.6 09/12/2023    MCV 86 09/12/2023     09/12/2023     09/12/2023    K 4.1 09/12/2023    GLU 92 09/12/2023    CREATININE 0.8 09/12/2023    EGFRNORACEVR >60.0 09/12/2023    HGBA1C 4.8 09/12/2023    TSH 2.040 08/10/2021      Review of patient's allergies indicates:   Allergen Reactions    No known drug allergies        Encounter Diagnosis   Name Primary?    Influenza Yes        No orders of the defined types were placed in this encounter.     Medications Ordered This Encounter   Medications    desloratadine (CLARINEX) 5 mg tablet     Sig: Take 1 tablet (5 mg total) by mouth once daily. for 7 days     Dispense:  7 tablet     Refill:  0    naproxen (NAPROSYN) 500 MG tablet     Sig: Take 1 tablet (500 mg total) by mouth 2 (two) times daily as needed (pain).     Dispense:  10 tablet     Refill:  0    oseltamivir (TAMIFLU) 75 MG capsule     Sig: Take 1 capsule (75 mg total) by mouth 2 (two) times daily. for 5 days     Dispense:  10 capsule     Refill:  0        E-Visit Time Tracking:    Day 1 Time (in minutes): 11    Total Time (in minutes): 11         274}

## 2025-03-03 ENCOUNTER — PATIENT MESSAGE (OUTPATIENT)
Dept: FAMILY MEDICINE | Facility: CLINIC | Age: 50
End: 2025-03-03
Payer: COMMERCIAL

## 2025-03-06 ENCOUNTER — PATIENT MESSAGE (OUTPATIENT)
Dept: ADMINISTRATIVE | Facility: HOSPITAL | Age: 50
End: 2025-03-06
Payer: COMMERCIAL

## 2025-03-17 ENCOUNTER — PATIENT MESSAGE (OUTPATIENT)
Dept: FAMILY MEDICINE | Facility: CLINIC | Age: 50
End: 2025-03-17
Payer: COMMERCIAL

## 2025-03-17 DIAGNOSIS — Z12.11 COLON CANCER SCREENING: Primary | ICD-10-CM

## 2025-03-18 ENCOUNTER — PATIENT OUTREACH (OUTPATIENT)
Dept: ADMINISTRATIVE | Facility: HOSPITAL | Age: 50
End: 2025-03-18
Payer: COMMERCIAL

## 2025-03-18 NOTE — TELEPHONE ENCOUNTER
I have signed for the following orders AND/OR meds.  Please call the patient and ask the patient to schedule the testing AND/OR inform about any medications that were sent. Medications have been sent to pharmacy listed below      Orders Placed This Encounter   Procedures    Cologuard Screening (Multitarget Stool DNA)     Standing Status:   Future     Number of Occurrences:   1     Expected Date:   3/18/2025     Expiration Date:   6/16/2026     Send normal result to authorizing provider's In Basket if patient is active on MyChart::   Yes              [facility] - [place]  [id number] [facility]  [place] [address]  Phone: [phone] Fax: [phone]    [facility] #[id number] - [place] - [address] [facility] [address] [facility]  [address]  [place] [address]  Phone: [phone] Fax: [phone]

## 2025-04-01 ENCOUNTER — RESULTS FOLLOW-UP (OUTPATIENT)
Dept: FAMILY MEDICINE | Facility: CLINIC | Age: 50
End: 2025-04-01

## 2025-04-14 ENCOUNTER — LAB VISIT (OUTPATIENT)
Dept: LAB | Facility: HOSPITAL | Age: 50
End: 2025-04-14
Attending: INTERNAL MEDICINE
Payer: COMMERCIAL

## 2025-04-14 ENCOUNTER — OFFICE VISIT (OUTPATIENT)
Dept: FAMILY MEDICINE | Facility: CLINIC | Age: 50
End: 2025-04-14
Payer: COMMERCIAL

## 2025-04-14 VITALS
DIASTOLIC BLOOD PRESSURE: 87 MMHG | SYSTOLIC BLOOD PRESSURE: 131 MMHG | BODY MASS INDEX: 34.78 KG/M2 | HEIGHT: 74 IN | WEIGHT: 271 LBS | TEMPERATURE: 98 F | HEART RATE: 76 BPM

## 2025-04-14 DIAGNOSIS — I10 PRIMARY HYPERTENSION: Primary | ICD-10-CM

## 2025-04-14 DIAGNOSIS — Z00.00 ENCOUNTER FOR ANNUAL HEALTH EXAMINATION: ICD-10-CM

## 2025-04-14 DIAGNOSIS — Z13.220 ENCOUNTER FOR LIPID SCREENING FOR CARDIOVASCULAR DISEASE: ICD-10-CM

## 2025-04-14 DIAGNOSIS — I10 PRIMARY HYPERTENSION: ICD-10-CM

## 2025-04-14 DIAGNOSIS — Z13.6 ENCOUNTER FOR LIPID SCREENING FOR CARDIOVASCULAR DISEASE: ICD-10-CM

## 2025-04-14 DIAGNOSIS — L30.9 ECZEMA, UNSPECIFIED TYPE: ICD-10-CM

## 2025-04-14 DIAGNOSIS — M17.0 PRIMARY OSTEOARTHRITIS OF BOTH KNEES: ICD-10-CM

## 2025-04-14 LAB
ABSOLUTE EOSINOPHIL (OHS): 0.36 K/UL
ABSOLUTE MONOCYTE (OHS): 0.45 K/UL (ref 0.3–1)
ABSOLUTE NEUTROPHIL COUNT (OHS): 2.85 K/UL (ref 1.8–7.7)
ALBUMIN SERPL BCP-MCNC: 3.8 G/DL (ref 3.5–5.2)
ALP SERPL-CCNC: 51 UNIT/L (ref 40–150)
ALT SERPL W/O P-5'-P-CCNC: 32 UNIT/L (ref 10–44)
ANION GAP (OHS): 5 MMOL/L (ref 8–16)
AST SERPL-CCNC: 20 UNIT/L (ref 11–45)
BASOPHILS # BLD AUTO: 0.11 K/UL
BASOPHILS NFR BLD AUTO: 2 %
BILIRUB SERPL-MCNC: 0.6 MG/DL (ref 0.1–1)
BUN SERPL-MCNC: 19 MG/DL (ref 6–20)
CALCIUM SERPL-MCNC: 9.4 MG/DL (ref 8.7–10.5)
CHLORIDE SERPL-SCNC: 106 MMOL/L (ref 95–110)
CHOLEST SERPL-MCNC: 194 MG/DL (ref 120–199)
CHOLEST/HDLC SERPL: 3.1 {RATIO} (ref 2–5)
CO2 SERPL-SCNC: 29 MMOL/L (ref 23–29)
CREAT SERPL-MCNC: 0.7 MG/DL (ref 0.5–1.4)
ERYTHROCYTE [DISTWIDTH] IN BLOOD BY AUTOMATED COUNT: 12.6 % (ref 11.5–14.5)
GFR SERPLBLD CREATININE-BSD FMLA CKD-EPI: >60 ML/MIN/1.73/M2
GLUCOSE SERPL-MCNC: 90 MG/DL (ref 70–110)
HCT VFR BLD AUTO: 46.3 % (ref 40–54)
HDLC SERPL-MCNC: 62 MG/DL (ref 40–75)
HDLC SERPL: 32 % (ref 20–50)
HGB BLD-MCNC: 15.5 GM/DL (ref 14–18)
IMM GRANULOCYTES # BLD AUTO: 0.03 K/UL (ref 0–0.04)
IMM GRANULOCYTES NFR BLD AUTO: 0.6 % (ref 0–0.5)
LDLC SERPL CALC-MCNC: 111.2 MG/DL (ref 63–159)
LYMPHOCYTES # BLD AUTO: 1.59 K/UL (ref 1–4.8)
MCH RBC QN AUTO: 29.9 PG (ref 27–31)
MCHC RBC AUTO-ENTMCNC: 33.5 G/DL (ref 32–36)
MCV RBC AUTO: 89 FL (ref 82–98)
NONHDLC SERPL-MCNC: 132 MG/DL
NUCLEATED RBC (/100WBC) (OHS): 0 /100 WBC
PLATELET # BLD AUTO: 224 K/UL (ref 150–450)
PMV BLD AUTO: 10 FL (ref 9.2–12.9)
POTASSIUM SERPL-SCNC: 4.6 MMOL/L (ref 3.5–5.1)
PROT SERPL-MCNC: 6.8 GM/DL (ref 6–8.4)
RBC # BLD AUTO: 5.18 M/UL (ref 4.6–6.2)
RELATIVE EOSINOPHIL (OHS): 6.7 %
RELATIVE LYMPHOCYTE (OHS): 29.5 % (ref 18–48)
RELATIVE MONOCYTE (OHS): 8.3 % (ref 4–15)
RELATIVE NEUTROPHIL (OHS): 52.9 % (ref 38–73)
SODIUM SERPL-SCNC: 140 MMOL/L (ref 136–145)
TRIGL SERPL-MCNC: 104 MG/DL (ref 30–150)
WBC # BLD AUTO: 5.39 K/UL (ref 3.9–12.7)

## 2025-04-14 PROCEDURE — 85025 COMPLETE CBC W/AUTO DIFF WBC: CPT

## 2025-04-14 PROCEDURE — 80061 LIPID PANEL: CPT

## 2025-04-14 PROCEDURE — 80053 COMPREHEN METABOLIC PANEL: CPT

## 2025-04-14 PROCEDURE — 99999 PR PBB SHADOW E&M-EST. PATIENT-LVL III: CPT | Mod: PBBFAC,,, | Performed by: INTERNAL MEDICINE

## 2025-04-14 PROCEDURE — 36415 COLL VENOUS BLD VENIPUNCTURE: CPT | Mod: PO

## 2025-04-14 RX ORDER — MELOXICAM 15 MG/1
15 TABLET ORAL
COMMUNITY

## 2025-04-14 NOTE — PROGRESS NOTES
Assessment/Plan:    Patient here for annual wellness exam. Health maintenance was reviewed and ordered.    Complete history and physical was completed today.  Complete and thorough medication reconciliation was performed.  Discussed risks and benefits of medications.  Advised patient on orders and health maintenance. Continue current medications listed on your summary sheet.    All questions were answered. Patient had no further concerns. Advised of diagnoses and plan, as listed below.    1. Primary hypertension  Overview:  Hypertension Medications               amLODIPine (NORVASC) 10 MG tablet Take 1 tablet (10 mg total) by mouth once daily.    lisinopriL (PRINIVIL,ZESTRIL) 20 MG tablet TAKE 1 TABLET(20 MG) BY MOUTH EVERY DAY        -at goal today  -continue lifestyle modification with low sodium diet and exercise   -discussed hypertension disease course and importance of treating high blood pressure  -patient understood and advised of risk of untreated blood pressure.  ER precautions were given   for symptoms of hypertensive urgency and emergency.    Orders:  -     Comprehensive Metabolic Panel; Standing    2. Eczema, unspecified type  Overview:  -remains on Dupixent  -followed by derm      3. Encounter for annual health examination  -     CBC Auto Differential; Standing  -     Comprehensive Metabolic Panel; Standing  -     Lipid Panel; Standing    4. Encounter for lipid screening for cardiovascular disease  -     Lipid Panel; Standing    5. Primary osteoarthritis of both knees  Overview:  -chronic, stable  -managed by ortho   -receiving joint injections and taking mobic PRN for symptoms          Betsey Mejia MD       WELLNESS EXAM      Patient ID: Luke Del Rosario is a 49 y.o. male.  has a past medical history of HLD (hyperlipidemia), HTN (hypertension) (2/7/2012), Migraines, and Panic disorder.     Chief Complaint:  Encounter for wellness exam    History of Present Illness  The patient presents for  annual exam.    He reports satisfactory control of his blood pressure at home. He is on amlodipine and lisinopril.    He continues his regimen of Dupixent for eczema management. He suspects that meloxicam may be exacerbating his eczema, necessitating the use of Dupixent.    He experiences daily knee pain, which he manages with meloxicam. He is also receiving knee injections from ortho.     No other new complaints today.  Remaining chronic conditions have been reviewed and remain stable. Further detail as stated above.      Health Maintenance reviewed - Annual labs ordered.     Health Maintenance Topics with due status: Not Due       Topic Last Completion Date    TETANUS VACCINE 05/10/2019    Hemoglobin A1c (Diabetic Prevention Screening) 09/12/2023    Lipid Panel 09/12/2023    Colorectal Cancer Screening 03/26/2025    RSV Vaccine (Age 60+ and Pregnant patients) Not Due        Past Medical History:  Past Medical History:   Diagnosis Date    HLD (hyperlipidemia)     HTN (hypertension) 2/7/2012    Migraines     Panic disorder      Past Surgical History:   Procedure Laterality Date    ARTHROSCOPY OF KNEE Right      Review of patient's allergies indicates:   Allergen Reactions    No known drug allergies      Medications Ordered Prior to Encounter[1]  Social History[2]  Family History   Problem Relation Name Age of Onset    Diabetes Father Judd Del Rosario     Diabetes Paternal Aunt Naomie Del Rosario     Diabetes Paternal Uncle Khanh Del Rosario        Review of Systems   Constitutional:  Negative for chills, fever and malaise/fatigue.   HENT:  Negative for congestion and sore throat.    Eyes:  Negative for blurred vision and double vision.   Respiratory:  Negative for cough and shortness of breath.    Cardiovascular:  Negative for chest pain, palpitations and leg swelling.   Gastrointestinal:  Negative for abdominal pain, constipation and diarrhea.   Genitourinary:  Negative for dysuria and frequency.   Musculoskeletal:   Positive for joint pain. Negative for back pain.   Neurological:  Negative for headaches.   Psychiatric/Behavioral:  Negative for depression. The patient is not nervous/anxious.          Objective:      Vitals:    04/14/25 0819   BP: 131/87   Pulse: 76   Temp: 97.9 °F (36.6 °C)     Body mass index is 34.79 kg/m².     Physical Exam  Constitutional:       General: He is not in acute distress.     Appearance: Normal appearance. He is well-developed.   HENT:      Head: Normocephalic and atraumatic.   Eyes:      Conjunctiva/sclera: Conjunctivae normal.   Cardiovascular:      Rate and Rhythm: Normal rate and regular rhythm.      Pulses: Normal pulses.      Heart sounds: Normal heart sounds. No murmur heard.  Pulmonary:      Effort: Pulmonary effort is normal. No respiratory distress.      Breath sounds: Normal breath sounds.   Abdominal:      General: Bowel sounds are normal. There is no distension.      Palpations: Abdomen is soft.      Tenderness: There is no abdominal tenderness.   Musculoskeletal:         General: Normal range of motion.      Cervical back: Normal range of motion and neck supple.   Skin:     General: Skin is warm and dry.      Findings: No rash.   Neurological:      General: No focal deficit present.      Mental Status: He is alert and oriented to person, place, and time.   Psychiatric:         Mood and Affect: Mood normal.         Behavior: Behavior normal.         All labs, imaging and procedures performed since last visit have been personally reviewed.    DISCLAIMER: This note was compiled by using a speech recognition dictation system and therefore please be aware that typographical / speech recognition errors can and do occur.  Please contact me if you see any errors specifically.  Consent was obtained for [employer] recording system prior to the visit.           [1]  Current Outpatient Medications on File Prior to Visit   Medication Sig Dispense Refill    amLODIPine (NORVASC) 10 MG tablet TAKE 1  TABLET(10 MG) BY MOUTH EVERY DAY 90 tablet 3    dupilumab (DUPIXENT PEN SUBQ) Inject into the skin.      lisinopriL (PRINIVIL,ZESTRIL) 20 MG tablet TAKE 1 TABLET(20 MG) BY MOUTH DAILY 90 tablet 3    magnesium 30 mg Tab Take by mouth.      meloxicam (MOBIC) 15 MG tablet Take 15 mg by mouth.      multivitamin (THERAGRAN) per tablet Take 1 tablet by mouth.      [DISCONTINUED] CINNAMON BARK (CINNAMON ORAL) Take by mouth.      [DISCONTINUED] desloratadine (CLARINEX) 5 mg tablet Take 1 tablet (5 mg total) by mouth once daily. for 7 days 7 tablet 0     No current facility-administered medications on file prior to visit.   [2]  Social History  Socioeconomic History    Marital status:    Tobacco Use    Smoking status: Never     Passive exposure: Never    Smokeless tobacco: Never   Substance and Sexual Activity    Alcohol use: Yes     Alcohol/week: 6.0 standard drinks of alcohol     Types: 6 Cans of beer per week    Drug use: No    Sexual activity: Yes     Partners: Female     Birth control/protection: I.U.D.     Social Drivers of Health     Financial Resource Strain: Patient Declined (3/20/2024)    Overall Financial Resource Strain (CARDIA)     Difficulty of Paying Living Expenses: Patient declined   Food Insecurity: Patient Declined (3/20/2024)    Hunger Vital Sign     Worried About Running Out of Food in the Last Year: Patient declined     Ran Out of Food in the Last Year: Patient declined   Transportation Needs: Patient Declined (3/20/2024)    PRAPARE - Transportation     Lack of Transportation (Medical): Patient declined     Lack of Transportation (Non-Medical): Patient declined   Physical Activity: Sufficiently Active (3/20/2024)    Exercise Vital Sign     Days of Exercise per Week: 5 days     Minutes of Exercise per Session: 30 min   Stress: No Stress Concern Present (3/20/2024)    [employer] - Occupational Stress Questionnaire     Feeling of Stress : Only a  little   Housing Stability: Patient Declined (3/20/2024)    Housing Stability Vital Sign     Unable to Pay for Housing in the Last Year: Patient declined     Unstable Housing in the Last Year: Patient declined

## 2025-04-15 ENCOUNTER — RESULTS FOLLOW-UP (OUTPATIENT)
Dept: PRIMARY CARE CLINIC | Facility: CLINIC | Age: 50
End: 2025-04-15

## 2025-04-15 NOTE — PROGRESS NOTES
Results have been released via [employer]. Please verify that these have been viewed by patient. If not, please call patient with results.    I have reviewed your recent results.    CBC NORMAL-The CBC appears to be stable at this time with no sign of major anemia, abnormal white count or platelet abnormality.  CMP/BMP NORMAL-The electrolytes all appear stable at this time.  This includes kidney functions along with routine electrolytes like sugar, potassium and sodium.  If it was a CMP test, the liver enzymes were noted to be stable also.  LIPID NORMAL SCREEN-The cholesterol panel screening showed levels that are considered at target at this time.  Recheck each year.     Please let me know if you have any additional questions or concerns about above.

## 2025-06-16 ENCOUNTER — TELEPHONE (OUTPATIENT)
Dept: FAMILY MEDICINE | Facility: CLINIC | Age: 50
End: 2025-06-16
Payer: COMMERCIAL

## 2025-06-16 ENCOUNTER — PATIENT MESSAGE (OUTPATIENT)
Dept: FAMILY MEDICINE | Facility: CLINIC | Age: 50
End: 2025-06-16
Payer: COMMERCIAL

## 2025-06-16 DIAGNOSIS — E66.09 CLASS 1 OBESITY DUE TO EXCESS CALORIES WITH SERIOUS COMORBIDITY AND BODY MASS INDEX (BMI) OF 33.0 TO 33.9 IN ADULT: Primary | ICD-10-CM

## 2025-06-16 DIAGNOSIS — E66.811 CLASS 1 OBESITY DUE TO EXCESS CALORIES WITH SERIOUS COMORBIDITY AND BODY MASS INDEX (BMI) OF 33.0 TO 33.9 IN ADULT: Primary | ICD-10-CM

## 2025-06-16 RX ORDER — TIRZEPATIDE 2.5 MG/.5ML
2.5 INJECTION, SOLUTION SUBCUTANEOUS
Qty: 4 PEN | Refills: 5 | Status: SHIPPED | OUTPATIENT
Start: 2025-06-16 | End: 2025-06-17 | Stop reason: ALTCHOICE

## 2025-06-16 NOTE — TELEPHONE ENCOUNTER
We can try Zepbound.     I have signed for the following orders AND/OR meds.  Please call the patient and ask the patient to schedule the testing AND/OR inform about any medications that were sent. Medications have been sent to pharmacy listed below      No orders of the defined types were placed in this encounter.      Medications Ordered This Encounter   Medications    tirzepatide, weight loss, (ZEPBOUND) 2.5 mg/0.5 mL PnIj     Sig: Inject 2.5 mg into the skin every 7 days.     Dispense:  4 Pen     Refill:  5         [facility] - [place]  [address] [address] [place] [address]  Phone: [phone] Fax: [phone]    [id number] - [place] - [id number] AT SEC OF [facility] [address] [facility]  [address]  [place] [address]  Phone: [phone] Fax: [phone]

## 2025-06-16 NOTE — TELEPHONE ENCOUNTER
Copied from CRM #[id number]. Topic: Medications - Medication Authorization  >> Jun 16, 2025  3:27 PM Kimberli wrote:  .Type:  Pharmacy Calling to Clarify an RX    Name of Caller: [facility]  Pharmacy Name:  [facility] [place] - [address] [facility]  [place] [address]  Phone: [phone] Fax: [phone].  Prescription Name: tirzepatide, weight loss, (ZEPBOUND) 2.5 mg/0.5 mL PnIj  What do they need to clarify?: they need to know if the quantity is 4 boxes or 4 pens  Best Call Back Number: [phone]  Additional Information:

## 2025-06-17 RX ORDER — SEMAGLUTIDE 0.25 MG/.5ML
0.25 INJECTION, SOLUTION SUBCUTANEOUS
Qty: 2 ML | Refills: 5 | Status: SHIPPED | OUTPATIENT
Start: 2025-06-17 | End: 2025-12-02

## 2025-06-17 NOTE — TELEPHONE ENCOUNTER
We can try Wegovy.     I have signed for the following orders AND/OR meds.  Please call the patient and ask the patient to schedule the testing AND/OR inform about any medications that were sent. Medications have been sent to pharmacy listed below      No orders of the defined types were placed in this encounter.      Medications Ordered This Encounter   Medications    semaglutide, weight loss, (WEGOVY) 0.25 mg/0.5 mL PnIj     Sig: Inject 0.25 mg into the skin every 7 days.     Dispense:  2 mL     Refill:  5         [facility] - [place]  [address] [address]  [place] [address]  Phone: [phone] Fax: [phone]    [id number] - [place] - [address] AT [facility] [address] [facility]  [address]  [place] [address]  Phone: [phone] Fax: [phone]